# Patient Record
Sex: FEMALE | Race: WHITE | NOT HISPANIC OR LATINO | Employment: FULL TIME | URBAN - METROPOLITAN AREA
[De-identification: names, ages, dates, MRNs, and addresses within clinical notes are randomized per-mention and may not be internally consistent; named-entity substitution may affect disease eponyms.]

---

## 2017-01-04 ENCOUNTER — OFFICE VISIT (OUTPATIENT)
Dept: FAMILY MEDICINE | Facility: CLINIC | Age: 27
End: 2017-01-04
Payer: COMMERCIAL

## 2017-01-04 VITALS
SYSTOLIC BLOOD PRESSURE: 106 MMHG | HEART RATE: 88 BPM | BODY MASS INDEX: 19.92 KG/M2 | OXYGEN SATURATION: 98 % | TEMPERATURE: 98.6 F | WEIGHT: 108.25 LBS | HEIGHT: 62 IN | DIASTOLIC BLOOD PRESSURE: 68 MMHG

## 2017-01-04 DIAGNOSIS — F41.1 GAD (GENERALIZED ANXIETY DISORDER): Primary | ICD-10-CM

## 2017-01-04 PROCEDURE — 99213 OFFICE O/P EST LOW 20 MIN: CPT | Performed by: FAMILY MEDICINE

## 2017-01-04 RX ORDER — CITALOPRAM HYDROBROMIDE 20 MG/1
20 TABLET ORAL DAILY
Qty: 90 TABLET | Refills: 1 | Status: SHIPPED | OUTPATIENT
Start: 2017-01-04 | End: 2017-03-13

## 2017-01-04 ASSESSMENT — ANXIETY QUESTIONNAIRES
6. BECOMING EASILY ANNOYED OR IRRITABLE: SEVERAL DAYS
1. FEELING NERVOUS, ANXIOUS, OR ON EDGE: SEVERAL DAYS
GAD7 TOTAL SCORE: 5
7. FEELING AFRAID AS IF SOMETHING AWFUL MIGHT HAPPEN: NOT AT ALL
IF YOU CHECKED OFF ANY PROBLEMS ON THIS QUESTIONNAIRE, HOW DIFFICULT HAVE THESE PROBLEMS MADE IT FOR YOU TO DO YOUR WORK, TAKE CARE OF THINGS AT HOME, OR GET ALONG WITH OTHER PEOPLE: SOMEWHAT DIFFICULT
2. NOT BEING ABLE TO STOP OR CONTROL WORRYING: SEVERAL DAYS
5. BEING SO RESTLESS THAT IT IS HARD TO SIT STILL: NOT AT ALL
3. WORRYING TOO MUCH ABOUT DIFFERENT THINGS: SEVERAL DAYS

## 2017-01-04 ASSESSMENT — PATIENT HEALTH QUESTIONNAIRE - PHQ9: 5. POOR APPETITE OR OVEREATING: SEVERAL DAYS

## 2017-01-04 NOTE — PROGRESS NOTES
SUBJECTIVE:                                                    Shoaib Walker is a 26 year old female who presents to clinic today for the following health issues:    History of Present Illness  Depression & Anxiety Follow-up:     Depression/Anxiety:  Anxiety only    Status since last visit::  Improved    Other associated symptoms of anxiety::  None    Significant life event::  YES    Current substance use::  Alcohol    Depression symptoms::  None  Diet::  Regular (no restrictions)  Frequency of exercise::  2-3 days/week  Duration of exercise::  15-30 minutes  Taking medications regularly::  Yes  Medication side effects::  None  Additional concerns today::  No    Started working out more. Now working out 3 times per week. Keeping track of eating habits. Trying to go to bed at consistent time.   Past few days - out of work as show ended. Some stress due to that. 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     Alcohol - socially usually. Does not do that to relieve stress. Does not do it on regular basis.     Problem list and histories reviewed & adjusted, as indicated.  Additional history: as documented    Problem list, Medication list, Allergies, and Medical/Social/Surgical histories reviewed in EPIC and updated as appropriate.         Social History     Social History     Marital Status: Single     Spouse Name: N/A     Number of Children: N/A     Years of Education: N/A     Occupational History     actress Self Employed.     Social History Main Topics     Smoking status: Never Smoker      Smokeless tobacco: None     Alcohol Use: Yes      Comment: 2-4 drinks a week      Drug Use: Yes      Comment: marijuana rarely 2x a year     Sexual Activity:     Partners: Male     Birth Control/ Protection: Pill, Condom     Other Topics Concern     Parent/Sibling W/ Cabg, Mi Or Angioplasty Before 65f 55m? No     Social History Narrative     No Known Allergies  Patient Active Problem List   Diagnosis     FRANCISCO (generalized anxiety disorder)  "    Reviewed medications, social history and  past medical and surgical history.    Review of system: for general, respiratory, CVS, GI and psychiatry negative except for noted above.     EXAM:  /68 mmHg  Pulse 88  Temp(Src) 98.6  F (37  C) (Oral)  Ht 5' 2\" (157.5 cm)  Wt 108 lb 4 oz (49.102 kg)  BMI 19.79 kg/m2  SpO2 98%  LMP 11/17/2016 (Approximate)  Constitutional: healthy, alert and no distress   Psychiatric: mentation appears normal and affect normal/bright  Cardiovascular: RRR. No murmurs,  Respiratory: negative, Lungs clear. No crackles or wheezing. No tachypnea.     ASSESSMENT / PLAN:  (F41.1) FRANCISCO (generalized anxiety disorder)  (primary encounter diagnosis)  Comment: tolerating it well. Go up on the dose. Lately some stress due to being unemployed. Will contact our  if more help is needed.   Plan: citalopram (CELEXA) 20 MG tablet    If doing well - follow up in 3-6 months.        ROS      Physical Exam      "

## 2017-01-04 NOTE — NURSING NOTE
"Chief Complaint   Patient presents with     Anxiety     Depression       Initial /68 mmHg  Pulse 88  Temp(Src) 98.6  F (37  C) (Oral)  Ht 5' 2\" (1.575 m)  Wt 108 lb 4 oz (49.102 kg)  BMI 19.79 kg/m2  SpO2 98%  LMP 11/17/2016 (Approximate) Estimated body mass index is 19.79 kg/(m^2) as calculated from the following:    Height as of this encounter: 5' 2\" (1.575 m).    Weight as of this encounter: 108 lb 4 oz (49.102 kg).  BP completed using cuff size: aman Mayorga CMA      "

## 2017-01-05 ASSESSMENT — ANXIETY QUESTIONNAIRES: GAD7 TOTAL SCORE: 5

## 2017-01-05 ASSESSMENT — PATIENT HEALTH QUESTIONNAIRE - PHQ9: SUM OF ALL RESPONSES TO PHQ QUESTIONS 1-9: 7

## 2017-01-09 ENCOUNTER — TELEPHONE (OUTPATIENT)
Dept: FAMILY MEDICINE | Facility: CLINIC | Age: 27
End: 2017-01-09

## 2017-01-09 NOTE — TELEPHONE ENCOUNTER
Patient had her wisdom teeth pulled and is calling about being in excruciating pain  States she was only told to use Ibuprofen and it is not covering  Discussed with Dr De Santiago and he will see patient this afternoon to discuss pain control.  Appointment scheduled for 2 PM  Elinor Fortune RN

## 2017-03-13 ENCOUNTER — TELEPHONE (OUTPATIENT)
Dept: FAMILY MEDICINE | Facility: CLINIC | Age: 27
End: 2017-03-13

## 2017-03-13 ENCOUNTER — OFFICE VISIT (OUTPATIENT)
Dept: FAMILY MEDICINE | Facility: CLINIC | Age: 27
End: 2017-03-13
Payer: COMMERCIAL

## 2017-03-13 VITALS
SYSTOLIC BLOOD PRESSURE: 96 MMHG | HEIGHT: 62 IN | DIASTOLIC BLOOD PRESSURE: 70 MMHG | BODY MASS INDEX: 19.96 KG/M2 | TEMPERATURE: 98 F | WEIGHT: 108.5 LBS | HEART RATE: 86 BPM | OXYGEN SATURATION: 99 %

## 2017-03-13 DIAGNOSIS — K12.0 CANKER SORE: Primary | ICD-10-CM

## 2017-03-13 DIAGNOSIS — F41.1 GAD (GENERALIZED ANXIETY DISORDER): ICD-10-CM

## 2017-03-13 PROCEDURE — 99213 OFFICE O/P EST LOW 20 MIN: CPT | Performed by: FAMILY MEDICINE

## 2017-03-13 RX ORDER — CITALOPRAM HYDROBROMIDE 20 MG/1
20 TABLET ORAL DAILY
Qty: 90 TABLET | Refills: 1 | Status: SHIPPED | OUTPATIENT
Start: 2017-03-13 | End: 2017-11-06

## 2017-03-13 RX ORDER — TRIAMCINOLONE ACETONIDE 0.1 %
PASTE (GRAM) DENTAL 2 TIMES DAILY
Qty: 5 G | Refills: 2 | Status: SHIPPED | OUTPATIENT
Start: 2017-03-13 | End: 2017-11-22

## 2017-03-13 NOTE — MR AVS SNAPSHOT
"              After Visit Summary   3/13/2017    Shoaib Walker    MRN: [id number]           Patient Information     Date Of Birth          1990        Visit Information        Provider Department      3/13/2017 8:20 AM Shree De Santiago MD [facility] [place]        Today's Diagnoses     Canker sore    -  1    FRANCISCO (generalized anxiety disorder)           Follow-ups after your visit        Who to contact     If you have questions or need follow up information about today's clinic visit or your schedule please contact [facility] directly at [phone].  Normal or non-critical lab and imaging results will be communicated to you by Metatomixhart, letter or phone within 4 business days after the clinic has received the results. If you do not hear from us within 7 days, please contact the clinic through Metatomixhart or phone. If you have a critical or abnormal lab result, we will notify you by phone as soon as possible.  Submit refill requests through TapBlaze or call your pharmacy and they will forward the refill request to us. Please allow 3 business days for your refill to be completed.          Additional Information About Your Visit        MyChart Information     TapBlaze lets you send messages to your doctor, view your test results, renew your prescriptions, schedule appointments and more. To sign up, go to [email or web address] . Click on \"Log in\" on the left side of the screen, which will take you to the Welcome page. Then click on \"Sign up Now\" on the right side of the page.     You will be asked to enter the access code listed below, as well as some personal information. Please follow the directions to create your username and password.     Your access code is: [email or web address]  Expires: 2017  2:41 PM     Your access code will  in 90 days. If you need help or a new code, please call your [facility] or [phone].        Care EveryWhere ID     This is your Care EveryWhere " "ID. This could be used by other organizations to access your [facility] medical records  [id number]        Your Vitals Were     Pulse Temperature Height Pulse Oximetry BMI (Body Mass Index)       86 98  F (36.7  C) (Oral) 5' 2\" (1.575 m) 99% 19.84 kg/m2        Blood Pressure from Last 3 Encounters:   03/13/17 96/70   01/04/17 106/68   12/07/16 96/74    Weight from Last 3 Encounters:   03/13/17 108 lb 8 oz (49.2 kg)   01/04/17 108 lb 4 oz (49.1 kg)   12/07/16 106 lb 12 oz (48.4 kg)              Today, you had the following     No orders found for display         Today's Medication Changes          These changes are accurate as of: 3/13/17  2:41 PM.  If you have any questions, ask your nurse or doctor.               Start taking these medicines.        Dose/Directions    triamcinolone 0.1 % paste   Commonly known as:  KENALOG   Used for:  Canker sore   Started by:  Shree De Santiago MD        Take by mouth 2 times daily   Quantity:  5 g   Refills:  2            Where to get your medicines      These medications were sent to [place] [address] [place] [facility] [address]  [address] [facility] [address]     Phone:  [phone]     citalopram 20 MG tablet    triamcinolone 0.1 % paste                Primary Care Provider Office Phone # Fax #    Shree De Santiago [phone]       [address] [address]        Thank you!     Thank you for choosing [facility]  for your care. Our goal is always to provide you with excellent care. Hearing back from our patients is one way we can continue to improve our services. Please take a few minutes to complete the written survey that you may receive in the mail after your visit with us. Thank you!             Your Updated Medication List - Protect others around you: Learn how to safely use, store and throw away your medicines at www.disposemymeds.org.          This " list is accurate as of: 3/13/17  2:41 PM.  Always use your most recent med list.                   Brand Name Dispense Instructions for use    citalopram 20 MG tablet    celeXA    90 tablet    Take 1 tablet (20 mg) by mouth daily       levonorgestrel 20 MCG/24HR IUD    MIRENA     1 each by Intrauterine route once Put in 12/6/2016       triamcinolone 0.1 % paste    KENALOG    5 g    Take by mouth 2 times daily

## 2017-03-13 NOTE — TELEPHONE ENCOUNTER
"The patient would like guidance on proper application of triamcinolone (KENALOG) 0.1 % paste as it needs to be applied to her mouth and the patient said that she feels like it isn't \"sticking\". Please follow up with the patient,okay to leave a message.  "

## 2017-03-13 NOTE — NURSING NOTE
"Chief Complaint   Patient presents with     Mouth Problem       Initial BP 96/70 (Cuff Size: Adult Regular)  Pulse 86  Temp 98  F (36.7  C) (Oral)  Ht 5' 2\" (1.575 m)  Wt 108 lb 8 oz (49.2 kg)  SpO2 99%  BMI 19.84 kg/m2 Estimated body mass index is 19.84 kg/(m^2) as calculated from the following:    Height as of this encounter: 5' 2\" (1.575 m).    Weight as of this encounter: 108 lb 8 oz (49.2 kg).  Medication Reconciliation: complete     Gertrude Mayorga, PAMELA      "

## 2017-03-13 NOTE — PROGRESS NOTES
SUBJECTIVE:                                                    Shoaib Walker is a 26 year old female who presents to clinic today for the following health issues:      Mouth Problem       Duration: 4 days     Description (location/character/radiation): canker sore under tongue and feels burning starting underneath tongue radiating up all over mouth, intermittent     Intensity:  severe    Accompanying signs and symptoms: sore throat and post nasal drip     History (similar episodes/previous evaluation): None    Precipitating or alleviating factors: None    Therapies tried and outcome: 1 liter of water with tspn of salt and baking soda and tried new tooth paste last night      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   Unable to keep good sleep hygiene and proper diet. Busy with work.   Had food outside and next day some soreness in mouth, also had canker sore under tongue.   No dietary restrictions.   No similar trouble before.   No acid reflex symptoms.   No smoke exposure. No concern of oral stds.     Anxiety - dose is fine. No major side effects.     Problem list and histories reviewed & adjusted, as indicated.  Additional history: as documented    Labs reviewed in EPIC    Reviewed and updated as needed this visit by clinical staff       Reviewed and updated as needed this visit by Provider           Social History     Social History     Marital status: Single     Spouse name: N/A     Number of children: N/A     Years of education: N/A     Occupational History     actress Self Employed.     Social History Main Topics     Smoking status: Never Smoker     Smokeless tobacco: None     Alcohol use Yes      Comment: 2-4 drinks a week      Drug use: Yes      Comment: marijuana rarely 2x a year     Sexual activity: Yes     Partners: Male     Birth control/ protection: Pill, Condom     Other Topics Concern     Parent/Sibling W/ Cabg, Mi Or Angioplasty Before 65f 55m? No     Social History Narrative     No Known Allergies  Patient  "Active Problem List   Diagnosis     FRANCISCO (generalized anxiety disorder)     Reviewed medications, social history and  past medical and surgical history.    Review of system: for general, respiratory, CVS, GI and psychiatry negative except for noted above.     EXAM:  BP 96/70 (Cuff Size: Adult Regular)  Pulse 86  Temp 98  F (36.7  C) (Oral)  Ht 5' 2\" (1.575 m)  Wt 108 lb 8 oz (49.2 kg)  SpO2 99%  BMI 19.84 kg/m2  Constitutional: healthy, alert and no distress   Psychiatric: mentation appears normal and affect normal/bright  Cardiovascular: RRR. No murmurs,  Respiratory: negative, Lungs clear. No crackles or wheezing. No tachypnea.   Neck: minimal cervical adenopathy.   ENT: both TM clear. Throat clear. Tongue on side mild erythema without any ulcer, under tongue small canker sore present.     ASSESSMENT / PLAN:  (K12.0) Canker sore  (primary encounter diagnosis)  Comment: topical kenalog. Manage work stress. If persist, follow pu.   Plan: triamcinolone (KENALOG) 0.1 % paste             (F41.1) FRANCISCO (generalized anxiety disorder)  Comment:  Patient is tolerating current medication without any major side effects of concerns and current dose seems reasonable too.  Current medication regime is effective. Continue current treatment without any changes.   Plan: citalopram (CELEXA) 20 MG tablet             Follow up in 6 months.     "

## 2017-03-13 NOTE — TELEPHONE ENCOUNTER
Routing to PCP.    Dr. De Santiago-Please review.  Any particular suggestions to assist with application of Kenalog 0.1% paste?  Should patient try to dry area prior to application?    Thank you!  PEÑA ReyN, RN

## 2017-03-13 NOTE — TELEPHONE ENCOUNTER
Agree - may be ideal to wipe it with clean gauge or wash cloth and then apply medication.  Even if it does not stick well, she can keep it in her mouth and through saliva it may spread in affected area and then she can either spit it (if accidentally swallows it - it is OK)

## 2017-03-13 NOTE — TELEPHONE ENCOUNTER
Writer called patient and reviewed message per below from Dr. De Santiago.    Discussed could keep medication in her mouth for a few minutes to help ensure it gets to affected area.    Patient verbalized understanding and in agreement with plan.  PEÑA ReyN, RN

## 2017-03-16 ENCOUNTER — TELEPHONE (OUTPATIENT)
Dept: FAMILY MEDICINE | Facility: CLINIC | Age: 27
End: 2017-03-16

## 2017-03-16 NOTE — TELEPHONE ENCOUNTER
Cold sores, or apthous ulcers can last awhile. Ibuprofen is a good idea.  Ibuprofen: 800 mg (4 tabs) every 8 hours or 600 mg (3 tabs) every 6 hours. Take with food or water to protect your stomach.  Pain decreases in 7 to 10 days, with complete healing in 1 to 3 weeks. Particularly large ulcers (greater than 1 cm in diameter) often take longer to heal (2 to 4 weeks). Occasionally, a severe occurrence may be accompanied by nonspecific symptoms of illness, such as fever. Canker sores often return.    Sincerely,  Dr. Jada Wei MD  3/16/2017

## 2017-03-16 NOTE — TELEPHONE ENCOUNTER
I called Shoaib and gave her Dr.Sparks hebert.  I asked her to call back if sx worsen or persist.  Shoaib agrees with plan.    Tiffanie Wynn RN

## 2017-03-16 NOTE — TELEPHONE ENCOUNTER
"Patient was seen 2 days ago regarding burning in her mouth and a canker sore under her tongue.  Was told it is an \"oral mucosal thing\"  Patient is asking if there is anything else other than the Kenalog paste that she can do.  Should she be using a mouth wash?  Can she take Ibuprofen?  Please advise.  Elinor Fortune RN  "

## 2017-03-22 ENCOUNTER — TELEPHONE (OUTPATIENT)
Dept: FAMILY MEDICINE | Facility: CLINIC | Age: 27
End: 2017-03-22

## 2017-03-22 NOTE — TELEPHONE ENCOUNTER
Pt saw pcp on 3/13/17 for tongue pain.    Still has burning on tongue.  Omeprazole helped.  Using paste and vit B12.  Pt now has swelling under her tongue. Along jaw on right side.    No fevers or trouble swallowing.  1 canker sore under tongue, which is not new.  Pt made appt for f/u with pcp for today.  JONATAN Harper

## 2017-11-06 DIAGNOSIS — F41.1 GAD (GENERALIZED ANXIETY DISORDER): ICD-10-CM

## 2017-11-06 RX ORDER — CITALOPRAM HYDROBROMIDE 20 MG/1
20 TABLET ORAL DAILY
Qty: 30 TABLET | Refills: 0 | Status: SHIPPED | OUTPATIENT
Start: 2017-11-06 | End: 2017-11-07

## 2017-11-06 NOTE — TELEPHONE ENCOUNTER
Pt needs appt now. Noted on rx for reminder to pt. Refilled for 30 days only.     Alysha Marte RN

## 2017-11-07 DIAGNOSIS — F41.1 GAD (GENERALIZED ANXIETY DISORDER): ICD-10-CM

## 2017-11-09 RX ORDER — CITALOPRAM HYDROBROMIDE 20 MG/1
20 TABLET ORAL DAILY
Qty: 30 TABLET | Refills: 0 | Status: SHIPPED | OUTPATIENT
Start: 2017-11-09 | End: 2017-11-13

## 2017-11-09 NOTE — TELEPHONE ENCOUNTER
Patient is asking for a 3 month supply  Last OV was 3/1317.    Last office note states:  (F41.1) FRANCISCO (generalized anxiety disorder)  Comment:  Patient is tolerating current medication without any major side effects of concerns and current dose seems reasonable too.  Current medication regime is effective. Continue current treatment without any changes.   Plan: citalopram (CELEXA) 20 MG tablet         Follow up in 6 months.

## 2017-11-13 DIAGNOSIS — F41.1 GAD (GENERALIZED ANXIETY DISORDER): ICD-10-CM

## 2017-11-13 NOTE — TELEPHONE ENCOUNTER
See 11/7/17 TE. PT also asked for 90 day supply then.  She is due for an ov for 6 month f/u.  JONATAN Harper

## 2017-11-14 RX ORDER — CITALOPRAM HYDROBROMIDE 20 MG/1
20 TABLET ORAL DAILY
Qty: 90 TABLET | Refills: 0 | Status: SHIPPED | OUTPATIENT
Start: 2017-11-14 | End: 2017-11-22

## 2017-11-14 NOTE — TELEPHONE ENCOUNTER
Seems like she has never been notified about need for follow up.  RN should route it to reception to notify patient about need for follow up appointment.

## 2017-11-22 ENCOUNTER — OFFICE VISIT (OUTPATIENT)
Dept: FAMILY MEDICINE | Facility: CLINIC | Age: 27
End: 2017-11-22
Payer: COMMERCIAL

## 2017-11-22 VITALS
WEIGHT: 108.75 LBS | HEART RATE: 84 BPM | TEMPERATURE: 98.2 F | HEIGHT: 62 IN | DIASTOLIC BLOOD PRESSURE: 68 MMHG | OXYGEN SATURATION: 97 % | BODY MASS INDEX: 20.01 KG/M2 | SYSTOLIC BLOOD PRESSURE: 108 MMHG

## 2017-11-22 DIAGNOSIS — F41.1 GAD (GENERALIZED ANXIETY DISORDER): Primary | ICD-10-CM

## 2017-11-22 DIAGNOSIS — K64.9 HEMORRHOIDS, UNSPECIFIED HEMORRHOID TYPE: ICD-10-CM

## 2017-11-22 DIAGNOSIS — Z23 NEED FOR PROPHYLACTIC VACCINATION AND INOCULATION AGAINST INFLUENZA: ICD-10-CM

## 2017-11-22 PROCEDURE — 90686 IIV4 VACC NO PRSV 0.5 ML IM: CPT | Performed by: FAMILY MEDICINE

## 2017-11-22 PROCEDURE — 90471 IMMUNIZATION ADMIN: CPT | Performed by: FAMILY MEDICINE

## 2017-11-22 PROCEDURE — 99214 OFFICE O/P EST MOD 30 MIN: CPT | Mod: 25 | Performed by: FAMILY MEDICINE

## 2017-11-22 RX ORDER — CITALOPRAM HYDROBROMIDE 20 MG/1
20 TABLET ORAL DAILY
Qty: 90 TABLET | Refills: 3 | Status: SHIPPED | OUTPATIENT
Start: 2017-11-22 | End: 2019-04-22

## 2017-11-22 ASSESSMENT — PATIENT HEALTH QUESTIONNAIRE - PHQ9
5. POOR APPETITE OR OVEREATING: SEVERAL DAYS
SUM OF ALL RESPONSES TO PHQ QUESTIONS 1-9: 8

## 2017-11-22 ASSESSMENT — ANXIETY QUESTIONNAIRES
1. FEELING NERVOUS, ANXIOUS, OR ON EDGE: SEVERAL DAYS
IF YOU CHECKED OFF ANY PROBLEMS ON THIS QUESTIONNAIRE, HOW DIFFICULT HAVE THESE PROBLEMS MADE IT FOR YOU TO DO YOUR WORK, TAKE CARE OF THINGS AT HOME, OR GET ALONG WITH OTHER PEOPLE: NOT DIFFICULT AT ALL
2. NOT BEING ABLE TO STOP OR CONTROL WORRYING: NOT AT ALL
3. WORRYING TOO MUCH ABOUT DIFFERENT THINGS: SEVERAL DAYS
7. FEELING AFRAID AS IF SOMETHING AWFUL MIGHT HAPPEN: NOT AT ALL
6. BECOMING EASILY ANNOYED OR IRRITABLE: SEVERAL DAYS
5. BEING SO RESTLESS THAT IT IS HARD TO SIT STILL: NOT AT ALL
GAD7 TOTAL SCORE: 4

## 2017-11-22 NOTE — MR AVS SNAPSHOT
"              After Visit Summary   11/22/2017    Shoaib Walker    MRN: [id number]           Patient Information     Date Of Birth          1990        Visit Information        Provider Department      11/22/2017 11:00 AM Shree De Santiago MD [facility]        Today's Diagnoses     FRANCISCO (generalized anxiety disorder)    -  1    Hemorrhoids, unspecified hemorrhoid type        Need for prophylactic vaccination and inoculation against influenza          Care Instructions    Follow up in a year.    PHQ 9 and FRANCISCO 7 over phone in 6 months.           Follow-ups after your visit        Who to contact     If you have questions or need follow up information about today's clinic visit or your schedule please contact [facility] directly at [phone].  Normal or non-critical lab and imaging results will be communicated to you by MyChart, letter or phone within 4 business days after the clinic has received the results. If you do not hear from us within 7 days, please contact the clinic through MyChart or phone. If you have a critical or abnormal lab result, we will notify you by phone as soon as possible.  Submit refill requests through [employer] or call your pharmacy and they will forward the refill request to us. Please allow 3 business days for your refill to be completed.          Additional Information About Your Visit        [employer] Information     [employer] lets you send messages to your doctor, view your test results, renew your prescriptions, schedule appointments and more. To sign up, go to [email or web address] . Click on \"Log in\" on the left side of the screen, which will take you to the Welcome page. Then click on \"Sign up Now\" on the right side of the page.     You will be asked to enter the access code listed below, as well as some personal information. Please follow the directions to create your username and password.     Your access code is: [email or web address]  Expires: " "2018  2:44 PM     Your access code will  in 90 days. If you need help or a new code, please call your [facility] or [phone].        Care EveryWhere ID     This is your Care EveryWhere ID. This could be used by other organizations to access your [place] medical records  [id number]        Your Vitals Were     Pulse Temperature Height Pulse Oximetry BMI (Body Mass Index)       84 98.2  F (36.8  C) (Oral) 5' 2\" (1.575 m) 97% 19.89 kg/m2        Blood Pressure from Last 3 Encounters:   17 108/68   17 96/70   17 106/68    Weight from Last 3 Encounters:   17 108 lb 12 oz (49.3 kg)   17 108 lb 8 oz (49.2 kg)   17 108 lb 4 oz (49.1 kg)              We Performed the Following     FLU VAC, SPLIT VIRUS IM > 3 YO (QUADRIVALENT) [96359]     Vaccine Administration, Initial [65099]          Today's Medication Changes          These changes are accurate as of: 17  2:44 PM.  If you have any questions, ask your nurse or doctor.               Start taking these medicines.        Dose/Directions    hydrocortisone 2.5 % cream   Commonly known as:  ANUSOL-HC   Used for:  Hemorrhoids, unspecified hemorrhoid type   Started by:  Shree De Santiago MD        Place rectally 2 times daily For 2 weeks.   Quantity:  30 g   Refills:  0         Stop taking these medicines if you haven't already. Please contact your care team if you have questions.     triamcinolone 0.1 % paste   Commonly known as:  KENALOG   Stopped by:  Shree De Santiago MD                Where to get your medicines      These medications were sent to [facility] [address] [place], [place] - [address]  [address] [facility] [address]     Phone:  [phone]     citalopram 20 MG tablet    hydrocortisone 2.5 % cream                Primary Care Provider Office Phone # Fax #    Shree De Santiago [phone] [address] [place] [id number]      "   Equal Access to Services     [facility] HospitalANATOLY : Hadii aad ku hadvenkatedmundo Braxtonali, waparthda luqadaha, qaedta edkaryndayanna john. So [facility] [phone].    ATENCIÓN: Si habla español, tiene a cutler disposición servicios gratuitos de asistencia lingüística. Llame al [phone].    We comply with applicable federal civil rights laws and [place] laws. We do not discriminate on the basis of race, color, national origin, age, disability, sex, sexual orientation, or gender identity.            Thank you!     Thank you for choosing [facility]  for your care. Our goal is always to provide you with excellent care. Hearing back from our patients is one way we can continue to improve our services. Please take a few minutes to complete the written survey that you may receive in the mail after your visit with us. Thank you!             Your Updated Medication List - Protect others around you: Learn how to safely use, store and throw away your medicines at www.disposemymeds.org.          This list is accurate as of: 11/22/17  2:44 PM.  Always use your most recent med list.                   Brand Name Dispense Instructions for use Diagnosis    citalopram 20 MG tablet    celeXA    90 tablet    Take 1 tablet (20 mg) by mouth daily    FRANCISCO (generalized anxiety disorder)       hydrocortisone 2.5 % cream    ANUSOL-HC    30 g    Place rectally 2 times daily For 2 weeks.    Hemorrhoids, unspecified hemorrhoid type       levonorgestrel 20 MCG/24HR IUD    MIRENA     1 each by Intrauterine route once Put in 12/6/2016        MULTI VITAMIN DAILY PO           SENNA LAX PO

## 2017-11-22 NOTE — NURSING NOTE
"Chief Complaint   Patient presents with     Anxiety       Initial /68 (Cuff Size: Adult Regular)  Pulse 84  Temp 98.2  F (36.8  C) (Oral)  Ht 5' 2\" (1.575 m)  Wt 108 lb 12 oz (49.3 kg)  SpO2 97%  BMI 19.89 kg/m2 Estimated body mass index is 19.89 kg/(m^2) as calculated from the following:    Height as of this encounter: 5' 2\" (1.575 m).    Weight as of this encounter: 108 lb 12 oz (49.3 kg).  Medication Reconciliation: complete     Gertrude Mayorga, PAMELA      "

## 2017-11-22 NOTE — PROGRESS NOTES
SUBJECTIVE:   Shoaib Walker is a 27 year old female who presents to clinic today for the following health issues:    Anxiety Follow-Up    Status since last visit: Improved     Other associated symptoms:None but had a breakdown last week     Complicating factors:   Significant life event: Yes-  Financial problems    Current substance abuse: None  Depression symptoms: No  FRANCISCO-7 SCORE 12/7/2016 1/4/2017 11/22/2017   Total Score 14 5 4   Some encounter information is confidential and restricted. Go to Review Flowsheets activity to see all data.     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   Boyfriend acts an actor.   financial strain.     Break down a week ago from news but overall doing well. Family and boyfriend supportive.     Does not have time to go back to therapist. No suicidal thoughts.     Some intestinal issue - constipation- had colonoscopy few years ago and it was normal.   uess senna.   History of hemorrhoid. Itching around rectum. Used preparation H in the past without major benefit. Tried miralax and metamucil without benefit.     Problem list and histories reviewed & adjusted, as indicated.   Additional history: as documented    Labs reviewed in EPIC     Reviewed and updated as needed this visit by clinical staffTobacco  Allergies  Meds  Med Hx  Surg Hx  Fam Hx  Soc Hx      Reviewed and updated as needed this visit by Provider      Social History     Social History     Marital status: Single     Spouse name: N/A     Number of children: N/A     Years of education: N/A     Occupational History     actress Self Employed.     Social History Main Topics     Smoking status: Never Smoker     Smokeless tobacco: Never Used     Alcohol use Yes       "Comment: 2-4 drinks a week      Drug use: Yes      Comment: marijuana rarely 2x a year     Sexual activity: Yes     Partners: Male     Birth control/ protection: Pill, Condom     Other Topics Concern     Parent/Sibling W/ Cabg, Mi Or Angioplasty Before 65f 55m? No     Social History Narrative     No Known Allergies  Patient Active Problem List   Diagnosis     FRANCISCO (generalized anxiety disorder)     Hemorrhoids, unspecified hemorrhoid type     Reviewed medications, social history and  past medical and surgical history.    Review of system: for general, respiratory, CVS, GI and psychiatry negative except for noted above.     EXAM:  /68 (Cuff Size: Adult Regular)  Pulse 84  Temp 98.2  F (36.8  C) (Oral)  Ht 5' 2\" (1.575 m)  Wt 108 lb 12 oz (49.3 kg)  SpO2 97%  BMI 19.89 kg/m2  Constitutional: healthy, alert and no distress   Psychiatric: mentation appears normal and affect normal/bright  Rectal: Deferred    ASSESSMENT / PLAN:  (F41.1) FRANCISCO (generalized anxiety disorder)  (primary encounter diagnosis)  Comment: Patient is tolerating current medication without any major side effects of concerns and current dose seems reasonable too.  Current medication regime is effective. Continue current treatment without any changes.   Plan: citalopram (CELEXA) 20 MG tablet             (Z23) Need for prophylactic vaccination and inoculation against influenza  Comment:    Plan: FLU VAC, SPLIT VIRUS IM > 3 YO (QUADRIVALENT)         [37764], Vaccine Administration, Initial         [04096]             (K64.9) Hemorrhoids, unspecified hemorrhoid type  Comment:  From history. OK to try fiber, anusol for 2 weeks. If not improving, contact clinic for colorectal referral.   Plan: hydrocortisone (ANUSOL-HC) 2.5 % cream               Patient Instructions   Follow up in a year.    PHQ 9 and FRANCISCO 7 over phone in 6 months.         Injectable Influenza Immunization Documentation    1.  Is the person to be vaccinated sick today?   No    2. " Does the person to be vaccinated have an allergy to a component   of the vaccine?   No  Egg Allergy Algorithm Link    3. Has the person to be vaccinated ever had a serious reaction   to influenza vaccine in the past?   No    4. Has the person to be vaccinated ever had Guillain-Barré syndrome?   No    Form completed by Gertrude Mayorga CMA

## 2017-11-23 ASSESSMENT — ANXIETY QUESTIONNAIRES: GAD7 TOTAL SCORE: 4

## 2018-12-18 ENCOUNTER — OFFICE VISIT (OUTPATIENT)
Dept: FAMILY MEDICINE | Facility: CLINIC | Age: 28
End: 2018-12-18
Payer: COMMERCIAL

## 2018-12-18 VITALS
HEART RATE: 70 BPM | DIASTOLIC BLOOD PRESSURE: 75 MMHG | WEIGHT: 112.5 LBS | TEMPERATURE: 98.6 F | HEIGHT: 62 IN | SYSTOLIC BLOOD PRESSURE: 107 MMHG | RESPIRATION RATE: 16 BRPM | BODY MASS INDEX: 20.7 KG/M2 | OXYGEN SATURATION: 99 %

## 2018-12-18 DIAGNOSIS — M79.661 PAIN OF RIGHT LOWER LEG: ICD-10-CM

## 2018-12-18 DIAGNOSIS — K62.5 RECTAL BLEEDING: Primary | ICD-10-CM

## 2018-12-18 PROCEDURE — 99214 OFFICE O/P EST MOD 30 MIN: CPT | Performed by: FAMILY MEDICINE

## 2018-12-18 RX ORDER — HYDROCORTISONE ACETATE 25 MG/1
25 SUPPOSITORY RECTAL 2 TIMES DAILY
Qty: 20 SUPPOSITORY | Refills: 0 | Status: SHIPPED | OUTPATIENT
Start: 2018-12-18 | End: 2019-06-24

## 2018-12-18 RX ORDER — MELOXICAM 15 MG/1
15 TABLET ORAL DAILY
Qty: 14 TABLET | Refills: 0 | Status: SHIPPED | OUTPATIENT
Start: 2018-12-18 | End: 2019-06-24

## 2018-12-18 ASSESSMENT — MIFFLIN-ST. JEOR: SCORE: 1183.55

## 2018-12-18 NOTE — PROGRESS NOTES
SUBJECTIVE:   Shoaib Walker is a 28 year old female who presents to clinic today for the following health issues:      Hemorrhoids       Duration: last year     Description:   Pain: no   Itching: yes-severe    Accompanying signs and symptoms:   Blood in stool: YES- sometimes   Changes in stool pattern: YES- varies     History (similar episodes/previous evaluation): None    Precipitating or alleviating factors: None    Therapies tried and outcome: preparation H and anusol-hc outcome: not helpful more fluids   - itchy at night time.   Sometime blood when wipe. Bright red blood.   No previous exam.  Colon cancer - father has colostomy.   History of colonoscopy - 4 yrs ago and it was OK.   Constipation and straining present.   Stool - daily or every other day.   Stool softner - senna, miralax, coffee - did not help.   Water - frequently.   Fiber - in diet.     Lot more muscle pains all the time. Always some knee and ankle issues. Strained quad when in college. Tried to do yoga - it made it worse.   No her feet most of the days for work.   No supplement or herbs.   Right knee, right calf, right upper back. When stands favors right side more.     Problem list and histories reviewed & adjusted, as indicated.  Additional history: as documented    Labs reviewed in EPIC    Reviewed and updated as needed this visit by clinical staff    Reviewed and updated as needed this visit by Provider      Social History     Socioeconomic History     Marital status: Single     Spouse name: Not on file     Number of children: Not on file     Years of education: Not on file     Highest education level: Not on file   Social Needs     Financial resource strain: Not on file     Food insecurity - worry: Not on file     Food insecurity - inability: Not on file     Transportation needs - medical: Not on file     Transportation needs - non-medical: Not on file   Occupational History     Occupation: actress     Employer: SELF EMPLOYED.   Tobacco  "Use     Smoking status: Never Smoker     Smokeless tobacco: Never Used   Substance and Sexual Activity     Alcohol use: Yes     Comment: 2-4 drinks a week      Drug use: Yes     Comment: marijuana rarely 2x a year     Sexual activity: Yes     Partners: Male     Birth control/protection: Pill, Condom   Other Topics Concern     Parent/sibling w/ CABG, MI or angioplasty before 65F 55M? No   Social History Narrative     Not on file     No Known Allergies  Patient Active Problem List   Diagnosis     FRANCISCO (generalized anxiety disorder)     Hemorrhoids, unspecified hemorrhoid type     Reviewed medications, social history and  past medical and surgical history.    Review of system: for general, respiratory, CVS, GI and psychiatry negative except for noted above.     EXAM:  /75 (BP Location: Right arm, Patient Position: Sitting, Cuff Size: Adult Regular)   Pulse 70   Temp 98.6  F (37  C) (Oral)   Resp 16   Ht 1.575 m (5' 2\")   Wt 51 kg (112 lb 8 oz)   SpO2 99%   BMI 20.58 kg/m    Constitutional: healthy, alert and no distress   Psychiatric: mentation appears normal and affect normal/bright  Right lower limb - gait normal. ROM not restricted. No swelling. No tender spot.  Rectal - external rectal exam done - small hemorrhoidal lesion present around 6 ooclock position in lithotomy position    ASSESSMENT / PLAN:  (K62.5) Rectal bleeding  (primary encounter diagnosis)  Comment: With concern of internal hemorrhoid and constipation.  Treating constipation would be most ideal for long-term benefit and I have had a long conversation with the patient how to do dietary changes and use a stool softener.  She understood.  I will use a hydrocortisone suppository and if she does not see improvement it would be prudent to see colorectal for follow-up.  She has a family history of colon cancer and she has had a negative colonoscopy.  Plan: COLORECTAL SURGERY REFERRAL, hydrocortisone         (ANUSOL-HC) 25 MG suppository        "     (M79.334) Pain of right lower leg  Comment: Unclear etiology.  I suspect it is most likely related to her work habit.  She has some discomfort and she is willing to consider physical therapy for core strengthening.  We will try NSAIDs for 2 weeks and see how she does.  If her symptoms are persistent we will look into it further and look for any other causes of myalgia.  We talked about rheumatological condition but her symptoms are not classical for those.  Plan: meloxicam (MOBIC) 15 MG tablet, [facility] PT, HAND,         AND CHIROPRACTIC REFERRAL            Follow up: As needed and yearly.    The above note was dictated using voice recognition. Although reviewed after completion, some word and grammatical error may remain .      Patient Instructions       Patient Education     Treating Hemorrhoids: Self-Care    Follow your healthcare provider s advice about caring for your hemorrhoids at home. Some treatments help relieve symptoms right away. Others involve making changes in your diet and exercise habits. These can help ease constipation and prevent hemorrhoid symptoms from coming back.  Relieving symptoms  Your healthcare provider may prescribe anti-inflammatory medicine to help ease your symptoms. The following tips will also help relieve pain and swelling.    Take sitz baths. Taking a sitz bath means sitting in a few inches of warm bath water. Soaking for 10 minutes twice a day can provide welcome relief from painful hemorrhoids. It can also help the area stay clean.    Develop good bowel habits. Use the bathroom when you need to. Don t ignore the urge to move your bowels. This can lead to constipation, hard stools, and straining. Also, don t read while on the toilet. Sit only as long as needed. Wipe gently with soft, unscented toilet tissue or baby wipes.    Use ice packs. Placing an ice pack on a thrombosed external hemorrhoid can help relieve pain right away. It will also help reduce the blood clot. Use the ice  for 15 to 20 minutes at a time. Keep a cloth between the ice and your skin to prevent skin damage.    Use other measures. Laxatives and enemas can help ease constipation. But use them only on your healthcare provider s advice. For symptom relief, try using cotton pads soaked in witch hazel. These are available at most drugstores. Over-the-counter hemorrhoid ointments and petroleum jelly can also provide relief.  Add fiber to your diet  Adding fiber to your diet can help relieve constipation by making stools softer and easier to pass. To increase your fiber intake, your healthcare provider may recommend a bulking agent, such as psyllium. This is a high-fiber supplement available at most grocery stores and drugstores. Eating more fiber-rich foods will also help. There are two types of fiber:    Insoluble fiber is the main ingredient in bulking agents. It s also found in foods such as wheat bran, whole-grain breads, fresh fruits, and vegetables.    Soluble fiber is found in foods such as oat bran. Although soluble fiber is good for you, it may not ease constipation as much as foods high in insoluble fiber.  Drink more water  Along with a high-fiber diet, drinking more water can help ease constipation. This is because insoluble fiber absorbs water, making stools soft and bulky. Be sure to drink plenty of water throughout the day. Drinking fruit juices, such as prune juice or apple juice, can also help prevent constipation.  Get more exercise  Regular exercise aids digestion and helps prevent constipation. It s also great for your health. So talk with your healthcare provider about starting an exercise program. Low-impact activities, such as swimming or walking, are good places to start. Take it easy at first. And remember to drink plenty of water when you exercise.  High-fiber foods  High-fiber foods offer many benefits. By making your stools softer, they help heal and prevent swollen hemorrhoids. They may also help  reduce the risk of colon and rectal cancer. Best of all, they re usually low in calories and taste great. Here are some examples of fiber-rich foods.    Whole grains, such as wheat bran, corn bran, and brown rice.    Vegetables, especially carrots, broccoli, cabbage, and peas.    Fruits, such as apples, bananas, raisins, peaches, and pears.    Nuts and legumes, especially peanuts, lentils, and kidney beans.  Easy ways to add fiber  The tips below offer some simple ways to add more high-fiber foods to your meals.    Start your day with a high-fiber breakfast. Eat a wheat bran cereal along with a sliced banana. Or, try peanut butter on whole-wheat toast.    Eat carrot sticks for snacks. They re easy to prepare, taste great, and are low in calories.    Use whole-grain breads instead of white bread for sandwiches.    Eat fruits for treats. Try an apple and some raisins instead of a candy bar.   Date Last Reviewed: 7/1/2016 2000-2018 [employer]. [address] [place] [id number]. All rights reserved. This information is not intended as a substitute for professional medical care. Always follow your healthcare professional's instructions.

## 2018-12-18 NOTE — PATIENT INSTRUCTIONS
Patient Education     Treating Hemorrhoids: Self-Care    Follow your healthcare provider s advice about caring for your hemorrhoids at home. Some treatments help relieve symptoms right away. Others involve making changes in your diet and exercise habits. These can help ease constipation and prevent hemorrhoid symptoms from coming back.  Relieving symptoms  Your healthcare provider may prescribe anti-inflammatory medicine to help ease your symptoms. The following tips will also help relieve pain and swelling.    Take sitz baths. Taking a sitz bath means sitting in a few inches of warm bath water. Soaking for 10 minutes twice a day can provide welcome relief from painful hemorrhoids. It can also help the area stay clean.    Develop good bowel habits. Use the bathroom when you need to. Don t ignore the urge to move your bowels. This can lead to constipation, hard stools, and straining. Also, don t read while on the toilet. Sit only as long as needed. Wipe gently with soft, unscented toilet tissue or baby wipes.    Use ice packs. Placing an ice pack on a thrombosed external hemorrhoid can help relieve pain right away. It will also help reduce the blood clot. Use the ice for 15 to 20 minutes at a time. Keep a cloth between the ice and your skin to prevent skin damage.    Use other measures. Laxatives and enemas can help ease constipation. But use them only on your healthcare provider s advice. For symptom relief, try using cotton pads soaked in witch hazel. These are available at most drugstores. Over-the-counter hemorrhoid ointments and petroleum jelly can also provide relief.  Add fiber to your diet  Adding fiber to your diet can help relieve constipation by making stools softer and easier to pass. To increase your fiber intake, your healthcare provider may recommend a bulking agent, such as psyllium. This is a high-fiber supplement available at most grocery stores and drugstores. Eating more fiber-rich foods will  also help. There are two types of fiber:    Insoluble fiber is the main ingredient in bulking agents. It s also found in foods such as wheat bran, whole-grain breads, fresh fruits, and vegetables.    Soluble fiber is found in foods such as oat bran. Although soluble fiber is good for you, it may not ease constipation as much as foods high in insoluble fiber.  Drink more water  Along with a high-fiber diet, drinking more water can help ease constipation. This is because insoluble fiber absorbs water, making stools soft and bulky. Be sure to drink plenty of water throughout the day. Drinking fruit juices, such as prune juice or apple juice, can also help prevent constipation.  Get more exercise  Regular exercise aids digestion and helps prevent constipation. It s also great for your health. So talk with your healthcare provider about starting an exercise program. Low-impact activities, such as swimming or walking, are good places to start. Take it easy at first. And remember to drink plenty of water when you exercise.  High-fiber foods  High-fiber foods offer many benefits. By making your stools softer, they help heal and prevent swollen hemorrhoids. They may also help reduce the risk of colon and rectal cancer. Best of all, they re usually low in calories and taste great. Here are some examples of fiber-rich foods.    Whole grains, such as wheat bran, corn bran, and brown rice.    Vegetables, especially carrots, broccoli, cabbage, and peas.    Fruits, such as apples, bananas, raisins, peaches, and pears.    Nuts and legumes, especially peanuts, lentils, and kidney beans.  Easy ways to add fiber  The tips below offer some simple ways to add more high-fiber foods to your meals.    Start your day with a high-fiber breakfast. Eat a wheat bran cereal along with a sliced banana. Or, try peanut butter on whole-wheat toast.    Eat carrot sticks for snacks. They re easy to prepare, taste great, and are low in calories.    Use  whole-grain breads instead of white bread for sandwiches.    Eat fruits for treats. Try an apple and some raisins instead of a candy bar.   Date Last Reviewed: 7/1/2016 2000-2018 [employer]. [address] [place] [address], [place] [address]. All rights reserved. This information is not intended as a substitute for professional medical care. Always follow your healthcare professional's instructions.

## 2019-04-22 DIAGNOSIS — F41.1 GAD (GENERALIZED ANXIETY DISORDER): ICD-10-CM

## 2019-04-22 RX ORDER — CITALOPRAM HYDROBROMIDE 20 MG/1
20 TABLET ORAL DAILY
Qty: 30 TABLET | Refills: 0 | Status: SHIPPED | OUTPATIENT
Start: 2019-04-22 | End: 2019-05-21

## 2019-04-22 NOTE — TELEPHONE ENCOUNTER
"Requested Prescriptions   Pending Prescriptions Disp Refills     citalopram (CELEXA) 20 MG tablet [Pharmacy Med Name: CITALOPRAM HBR 20 MG TABLET] 90 tablet 1     Sig: TAKE 1 TABLET (20 MG) BY MOUTH DAILY  Last Written Prescription Date:  11/22/2017  Last Fill Quantity: 90 tablet,  # refills: 3   Last Office Visit: 12/18/2018   Future Office Visit:            SSRIs Protocol Passed - 4/22/2019 12:23 PM        Passed - Recent (12 mo) or future (30 days) visit within the authorizing provider's specialty     Patient had office visit in the last 12 months or has a visit in the next 30 days with authorizing provider or within the authorizing provider's specialty.  See \"Patient Info\" tab in inbasket, or \"Choose Columns\" in Meds & Orders section of the refill encounter.            Passed - Medication is active on med list        Passed - Patient is age 18 or older        Passed - No active pregnancy on record        Passed - No positive pregnancy test in last 12 months          "

## 2019-05-21 DIAGNOSIS — F41.1 GAD (GENERALIZED ANXIETY DISORDER): ICD-10-CM

## 2019-05-21 NOTE — TELEPHONE ENCOUNTER
"Requested Prescriptions   Pending Prescriptions Disp Refills     citalopram (CELEXA) 20 MG tablet [Pharmacy Med Name: CITALOPRAM HBR 20 MG TABLET] 30 tablet 0     Sig: TAKE 1 TABLET BY MOUTH EVERY DAY  Last Written Prescription Date:  4/22/2019  Last Fill Quantity: 30 tablet,  # refills: 0   Last Office Visit: 12/18/2018   Future Office Visit:            SSRIs Protocol Passed - 5/21/2019  1:17 AM        Passed - Recent (12 mo) or future (30 days) visit within the authorizing provider's specialty     Patient had office visit in the last 12 months or has a visit in the next 30 days with authorizing provider or within the authorizing provider's specialty.  See \"Patient Info\" tab in inbasket, or \"Choose Columns\" in Meds & Orders section of the refill encounter.            Passed - Medication is active on med list        Passed - Patient is age 18 or older        Passed - No active pregnancy on record        Passed - No positive pregnancy test in last 12 months          "

## 2019-05-22 RX ORDER — CITALOPRAM HYDROBROMIDE 20 MG/1
TABLET ORAL
Qty: 15 TABLET | Refills: 0 | Status: SHIPPED | OUTPATIENT
Start: 2019-05-22 | End: 2019-06-14

## 2019-05-22 NOTE — TELEPHONE ENCOUNTER
Routing refill request to provider for review/approval because:  Nicky given x1 and patient did not follow up, please advise

## 2019-06-14 DIAGNOSIS — F41.1 GAD (GENERALIZED ANXIETY DISORDER): ICD-10-CM

## 2019-06-14 NOTE — TELEPHONE ENCOUNTER
"Requested Prescriptions   Pending Prescriptions Disp Refills     citalopram (CELEXA) 20 MG tablet [Pharmacy Med Name: CITALOPRAM HBR 20 MG TABLET]  Last Written Prescription Date:  5/22/2019  Last Fill Quantity: 15 tabs,  # refills: 0   Last office visit: 12/18/2018 with prescribing provider:  Jonnathan   Future Office Visit:     15 tablet 0     Sig: TAKE 1 TABLET BY MOUTH EVERY DAY       SSRIs Protocol Passed - 6/14/2019  9:14 AM        Passed - Recent (12 mo) or future (30 days) visit within the authorizing provider's specialty     Patient had office visit in the last 12 months or has a visit in the next 30 days with authorizing provider or within the authorizing provider's specialty.  See \"Patient Info\" tab in inbasket, or \"Choose Columns\" in Meds & Orders section of the refill encounter.              Passed - Medication is active on med list        Passed - Patient is age 18 or older        Passed - No active pregnancy on record        Passed - No positive pregnancy test in last 12 months          "

## 2019-06-17 RX ORDER — CITALOPRAM HYDROBROMIDE 20 MG/1
TABLET ORAL
Qty: 7 TABLET | Refills: 0 | Status: SHIPPED | OUTPATIENT
Start: 2019-06-17 | End: 2019-06-24

## 2019-06-17 NOTE — TELEPHONE ENCOUNTER
"Routing refill request to provider for review/approval because:  --Last two refill orders sent 4/22 and 5/22 included reminders needs an office visit with tapered dispense.  --5/21/19 refill encounter Jonnathan wrote  - \"Please advise patient to schedule an appointment before any further refill. \"  -- left for patient 5/23/19 asking her to schedule CPE.     ,  --Please contact patient and ask to schedule physical.      --A refill request for below medication was sent to the provider.            Last Office Visit: 12/18/2018 acute care.    Future Office Visit:  none               "

## 2019-06-24 ENCOUNTER — OFFICE VISIT (OUTPATIENT)
Dept: FAMILY MEDICINE | Facility: CLINIC | Age: 29
End: 2019-06-24
Payer: COMMERCIAL

## 2019-06-24 VITALS
DIASTOLIC BLOOD PRESSURE: 69 MMHG | BODY MASS INDEX: 20.8 KG/M2 | RESPIRATION RATE: 16 BRPM | HEIGHT: 62 IN | TEMPERATURE: 97 F | SYSTOLIC BLOOD PRESSURE: 103 MMHG | WEIGHT: 113 LBS | OXYGEN SATURATION: 95 % | HEART RATE: 72 BPM

## 2019-06-24 DIAGNOSIS — Z12.4 SCREENING FOR CERVICAL CANCER: ICD-10-CM

## 2019-06-24 DIAGNOSIS — Z13.6 SCREENING FOR CARDIOVASCULAR CONDITION: ICD-10-CM

## 2019-06-24 DIAGNOSIS — F41.1 GAD (GENERALIZED ANXIETY DISORDER): ICD-10-CM

## 2019-06-24 DIAGNOSIS — Z00.00 ROUTINE GENERAL MEDICAL EXAMINATION AT A HEALTH CARE FACILITY: Primary | ICD-10-CM

## 2019-06-24 DIAGNOSIS — Z13.1 SCREENING FOR DIABETES MELLITUS: ICD-10-CM

## 2019-06-24 PROCEDURE — G0145 SCR C/V CYTO,THINLAYER,RESCR: HCPCS | Performed by: FAMILY MEDICINE

## 2019-06-24 PROCEDURE — 82947 ASSAY GLUCOSE BLOOD QUANT: CPT | Performed by: FAMILY MEDICINE

## 2019-06-24 PROCEDURE — 87624 HPV HI-RISK TYP POOLED RSLT: CPT | Performed by: FAMILY MEDICINE

## 2019-06-24 PROCEDURE — 36415 COLL VENOUS BLD VENIPUNCTURE: CPT | Performed by: FAMILY MEDICINE

## 2019-06-24 PROCEDURE — 80061 LIPID PANEL: CPT | Performed by: FAMILY MEDICINE

## 2019-06-24 PROCEDURE — 99395 PREV VISIT EST AGE 18-39: CPT | Performed by: FAMILY MEDICINE

## 2019-06-24 RX ORDER — CITALOPRAM HYDROBROMIDE 20 MG/1
20 TABLET ORAL DAILY
Qty: 90 TABLET | Refills: 3 | Status: SHIPPED | OUTPATIENT
Start: 2019-06-24 | End: 2020-07-16

## 2019-06-24 ASSESSMENT — PATIENT HEALTH QUESTIONNAIRE - PHQ9
SUM OF ALL RESPONSES TO PHQ QUESTIONS 1-9: 10
5. POOR APPETITE OR OVEREATING: MORE THAN HALF THE DAYS

## 2019-06-24 ASSESSMENT — ANXIETY QUESTIONNAIRES
2. NOT BEING ABLE TO STOP OR CONTROL WORRYING: NOT AT ALL
5. BEING SO RESTLESS THAT IT IS HARD TO SIT STILL: SEVERAL DAYS
IF YOU CHECKED OFF ANY PROBLEMS ON THIS QUESTIONNAIRE, HOW DIFFICULT HAVE THESE PROBLEMS MADE IT FOR YOU TO DO YOUR WORK, TAKE CARE OF THINGS AT HOME, OR GET ALONG WITH OTHER PEOPLE: SOMEWHAT DIFFICULT
6. BECOMING EASILY ANNOYED OR IRRITABLE: NEARLY EVERY DAY
7. FEELING AFRAID AS IF SOMETHING AWFUL MIGHT HAPPEN: NOT AT ALL
GAD7 TOTAL SCORE: 8
1. FEELING NERVOUS, ANXIOUS, OR ON EDGE: SEVERAL DAYS
3. WORRYING TOO MUCH ABOUT DIFFERENT THINGS: SEVERAL DAYS

## 2019-06-24 ASSESSMENT — ENCOUNTER SYMPTOMS
JOINT SWELLING: 0
SORE THROAT: 1
ABDOMINAL PAIN: 0
EYE PAIN: 0
ARTHRALGIAS: 1
WEAKNESS: 1
FREQUENCY: 0
CONSTIPATION: 1
HEMATURIA: 0
DYSURIA: 0
HEADACHES: 0
PARESTHESIAS: 0
PALPITATIONS: 0
DIZZINESS: 1
FEVER: 0
HEMATOCHEZIA: 0
MYALGIAS: 1
HEARTBURN: 0
CHILLS: 0
DIARRHEA: 1
SHORTNESS OF BREATH: 1
NERVOUS/ANXIOUS: 1
NAUSEA: 0
COUGH: 0

## 2019-06-24 ASSESSMENT — MIFFLIN-ST. JEOR: SCORE: 1180.81
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
## 2019-06-24 NOTE — LETTER
[place] [id number] [place] [id number] [phone]          June 28, 2019    Shoaib Wright                                                                                                                     [address] [place] [id number]          Dear Shoaib,  Your cholesterol and diabetes tests are within normal limit.  Your good cholesterol HDL is slightly on lower side which improves with physical activity.     Results for orders placed or performed in visit on 06/24/19   Pap imaged thin layer screen with HPV - recommended age 30 - 65 years (select HPV order below)   Result Value Ref Range    PAP NIL     Copath Report         Patient Name: SHOAIB WRIGHT  MR#: [id number]  Specimen #: [id number]  Collected: 6/24/2019  Received: 6/25/2019  Reported: 6/27/2019 09:27  Ordering Phy(s): KAMALJIT MCDANIEL    For improved result formatting, select 'View Enhanced Report Format' under   Linked Documents section.    SPECIMEN/STAIN PROCESS:  Pap imaged thin layer prep screening (Surepath, FocalPoint with guided   screening)       Pap-Cyto x 1, HPV ordered x 1    SOURCE: Cervical, endocervical  ----------------------------------------------------------------   Pap imaged thin layer prep screening (Surepath, FocalPoint with guided   screening)  SPECIMEN ADEQUACY:  Satisfactory for evaluation.  -Transformation zone component present.    CYTOLOGIC INTERPRETATION:    Negative for intraepithelial lesion or malignancy    Electronically signed out by:  LISA Simmons (ASCP)    CLINICAL HISTORY:    Intra-Uterine Device,    Papanicolaou Test Limitations:  Cervical cytology is a screening test with   limited sensiti vity; regular  screening is critical for cancer prevention; Pap tests are primarily   effective for the diagnosis/prevention of  squamous cell carcinoma, not adenocarcinomas or other cancers.    COLLECTION SITE:  Client:  [facility]  [facility]  Location: [facility] (B)    The technical component of this testing was completed at the [facility], with the professional component performed   at the [facility], [address] [place] [id number] ([phone])       HPV High Risk Types DNA Cervical   Result Value Ref Range    HPV Source SurePath     HPV 16 DNA Negative NEG^Negative    HPV 18 DNA Negative NEG^Negative    Other HR HPV Negative NEG^Negative    Final Diagnosis This patient's sample is negative for HPV DNA.     Specimen Description Cervical Cells    Lipid panel reflex to direct LDL Non-fasting   Result Value Ref Range    Cholesterol 140 <200 mg/dL    Triglycerides 141 <150 mg/dL    HDL Cholesterol 47 (L) >49 mg/dL    LDL Cholesterol Calculated 65 <100 mg/dL    Non HDL Cholesterol 93 <130 mg/dL   Glucose   Result Value Ref Range    Glucose 77 70 - 99 mg/dL       Sincerely,         Shree De Santiago MD.

## 2019-06-24 NOTE — LETTER
July 2, 2019    Shoaib Walker  [address] [place] [address]  [facility] [address]    Dear ,  This letter is regarding your recent Pap smear (cervical cancer screening) and Human Papillomavirus (HPV) test.  We are happy to inform you that your Pap smear result is normal. Cervical cancer is closely linked with certain types of HPV. Your results showed no evidence of high-risk HPV.  We recommend you have your next PAP smear and HPV test in 5 years.  You will still need to return to the clinic every year for an annual exam and other preventive tests.  If you have additional questions regarding this result, please call our registered nurse, Lacey at [phone].  Sincerely,    Shree De Santiago MD /[facility]

## 2019-06-25 LAB
CHOLEST SERPL-MCNC: 140 MG/DL
GLUCOSE SERPL-MCNC: 77 MG/DL (ref 70–99)
HDLC SERPL-MCNC: 47 MG/DL
LDLC SERPL CALC-MCNC: 65 MG/DL
NONHDLC SERPL-MCNC: 93 MG/DL
TRIGL SERPL-MCNC: 141 MG/DL

## 2019-06-25 ASSESSMENT — ANXIETY QUESTIONNAIRES: GAD7 TOTAL SCORE: 8

## 2019-06-27 LAB
COPATH REPORT: NORMAL
PAP: NORMAL

## 2019-06-28 LAB
FINAL DIAGNOSIS: NORMAL
HPV HR 12 DNA CVX QL NAA+PROBE: NEGATIVE
HPV16 DNA SPEC QL NAA+PROBE: NEGATIVE
HPV18 DNA SPEC QL NAA+PROBE: NEGATIVE
SPECIMEN DESCRIPTION: NORMAL
SPECIMEN SOURCE CVX/VAG CYTO: NORMAL

## 2020-01-21 ENCOUNTER — OFFICE VISIT (OUTPATIENT)
Dept: FAMILY MEDICINE | Facility: CLINIC | Age: 30
End: 2020-01-21
Payer: COMMERCIAL

## 2020-01-21 ENCOUNTER — NURSE TRIAGE (OUTPATIENT)
Dept: NURSING | Facility: CLINIC | Age: 30
End: 2020-01-21

## 2020-01-21 VITALS
WEIGHT: 112 LBS | BODY MASS INDEX: 20.61 KG/M2 | OXYGEN SATURATION: 98 % | HEART RATE: 75 BPM | SYSTOLIC BLOOD PRESSURE: 100 MMHG | RESPIRATION RATE: 16 BRPM | TEMPERATURE: 98.1 F | HEIGHT: 62 IN | DIASTOLIC BLOOD PRESSURE: 68 MMHG

## 2020-01-21 DIAGNOSIS — H57.89 REDNESS OF LEFT EYE: Primary | ICD-10-CM

## 2020-01-21 PROCEDURE — 99213 OFFICE O/P EST LOW 20 MIN: CPT | Performed by: FAMILY MEDICINE

## 2020-01-21 RX ORDER — POLYMYXIN B SULFATE AND TRIMETHOPRIM 1; 10000 MG/ML; [USP'U]/ML
1-2 SOLUTION OPHTHALMIC
Qty: 1 BOTTLE | Refills: 0 | Status: SHIPPED | OUTPATIENT
Start: 2020-01-21 | End: 2020-06-01

## 2020-01-21 ASSESSMENT — ANXIETY QUESTIONNAIRES
7. FEELING AFRAID AS IF SOMETHING AWFUL MIGHT HAPPEN: NOT AT ALL
2. NOT BEING ABLE TO STOP OR CONTROL WORRYING: SEVERAL DAYS
GAD7 TOTAL SCORE: 4
4. TROUBLE RELAXING: NOT AT ALL
GAD7 TOTAL SCORE: 4
1. FEELING NERVOUS, ANXIOUS, OR ON EDGE: SEVERAL DAYS
GAD7 TOTAL SCORE: 4
3. WORRYING TOO MUCH ABOUT DIFFERENT THINGS: SEVERAL DAYS
7. FEELING AFRAID AS IF SOMETHING AWFUL MIGHT HAPPEN: NOT AT ALL
6. BECOMING EASILY ANNOYED OR IRRITABLE: SEVERAL DAYS
5. BEING SO RESTLESS THAT IT IS HARD TO SIT STILL: NOT AT ALL

## 2020-01-21 ASSESSMENT — PATIENT HEALTH QUESTIONNAIRE - PHQ9
10. IF YOU CHECKED OFF ANY PROBLEMS, HOW DIFFICULT HAVE THESE PROBLEMS MADE IT FOR YOU TO DO YOUR WORK, TAKE CARE OF THINGS AT HOME, OR GET ALONG WITH OTHER PEOPLE: SOMEWHAT DIFFICULT
SUM OF ALL RESPONSES TO PHQ QUESTIONS 1-9: 5
SUM OF ALL RESPONSES TO PHQ QUESTIONS 1-9: 5

## 2020-01-21 ASSESSMENT — MIFFLIN-ST. JEOR: SCORE: 1176.28

## 2020-01-21 NOTE — TELEPHONE ENCOUNTER
"Shoaib is calling and states that left eye has been sore for about one week.  Eye is pink and sore on the bottom and no discharge.  Denies fever.  Now whole eye is red.  Eye is blinking due to pain.  Shoaib as washed her eye out.      Reason for Disposition    Eyelid is red and painful (or tender to touch)    Additional Information    Negative: Severe eye pain    Negative: [1] Eyelids are very swollen (shut or almost) AND [2] fever    Negative: [1] Eyelid (outer) is very red (or tender to touch) AND [2] fever    Negative: [1] Foreign body sensation (\"feels like something is in there\") AND [2] irrigation didn't help    Negative: Vomiting    Negative: [1] Recent eye surgery AND [2] increasing eye pain    Negative: Patient sounds very sick or weak to the triager    Negative: Blurred vision    Negative: [1] Eye pain AND [2] more than mild    Negative: Cloudy spot or sore seen on the cornea (clear part of the eye)    Negative: Eyelid is very swollen (shut or almost)    Protocols used: EYE - RED WITHOUT PUS-A-AH      "

## 2020-01-21 NOTE — PROGRESS NOTES
Subjective     Shoaib Walker is a 29 year old female who presents to clinic today for the following health issues:    HPI   Eye(s) Problem      Duration: x 1 week, worsened today    Description:  Location: left  Pain: YES  Redness: YES  Discharge: no     Accompanying signs and symptoms: Blinking due to pain    History (Trauma, foreign body exposure,): None    Precipitating or alleviating factors (contact use): None    Therapies tried and outcome: Washed eye out which did not help    1.5 weeks ago noticed it.   Using hydrocortisone around eyelid for eczema.     This am - more redness.   No crusing.   No trouble with opening eyes.   No contact lenses.   History of lasik.  Some dull pain left upper eyelid.   Some post nasal drip which is not new.   No known pink eye exposure.     Mood is OK. Some social stressor - mostly money. Overall feels fine with current dose of celexa.    Answers for HPI/ROS submitted by the patient on 1/21/2020   If you checked off any problems, how difficult have these problems made it for you to do your work, take care of things at home, or get along with other people?: Somewhat difficult  PHQ9 TOTAL SCORE: 5  FRANCISCO 7 TOTAL SCORE: 4      Social History     Occupational History     Occupation: actress     Employer: SELF EMPLOYED.   Tobacco Use     Smoking status: Never Smoker     Smokeless tobacco: Never Used   Substance and Sexual Activity     Alcohol use: Yes     Comment: 2-4 drinks a week      Drug use: Yes     Comment: marijuana rarely 2x a year     Sexual activity: Yes     Partners: Male     Birth control/protection: Pill, Condom     No Known Allergies  Patient Active Problem List   Diagnosis     FRANCISCO (generalized anxiety disorder)     Hemorrhoids, unspecified hemorrhoid type     Reviewed medications, social history and  past medical and surgical history.    Review of system: for general, respiratory, CVS, GI and psychiatry negative except for noted above.     EXAM:  /68 (BP Location:  "Right arm, Patient Position: Sitting, Cuff Size: Adult Regular)   Pulse 75   Temp 98.1  F (36.7  C) (Oral)   Resp 16   Ht 1.575 m (5' 2\")   Wt 50.8 kg (112 lb)   SpO2 98%   Breastfeeding No   BMI 20.49 kg/m    Constitutional: healthy, alert and no distress   Psychiatric: mentation appears normal and affect normal/bright  Both eyes examined - RABIA. Conjunctival erythema left lateral canthus. Left upper eyelid mild swelling. No erythema.     ASSESSMENT / PLAN:  (H57.89) Redness of left eye  (primary encounter diagnosis)  Comment: could be from irritation. Does not have typical symptoms of conjunctivitis but due to duration I think it is appropriate to treat with antibiotics drops. Not using contacts. If not improving - call for ophthalmology referral.   Plan: trimethoprim-polymyxin b (POLYTRIM) 75572-2.1         UNIT/ML-% ophthalmic solution                  The above note was dictated using voice recognition. Although reviewed after completion, some word and grammatical error may remain .            "

## 2020-01-22 ASSESSMENT — PATIENT HEALTH QUESTIONNAIRE - PHQ9: SUM OF ALL RESPONSES TO PHQ QUESTIONS 1-9: 5

## 2020-01-22 ASSESSMENT — ANXIETY QUESTIONNAIRES: GAD7 TOTAL SCORE: 4

## 2020-06-01 ENCOUNTER — OFFICE VISIT (OUTPATIENT)
Dept: FAMILY MEDICINE | Facility: CLINIC | Age: 30
End: 2020-06-01

## 2020-06-01 VITALS
WEIGHT: 114.2 LBS | HEART RATE: 111 BPM | OXYGEN SATURATION: 97 % | DIASTOLIC BLOOD PRESSURE: 70 MMHG | TEMPERATURE: 98.7 F | RESPIRATION RATE: 16 BRPM | SYSTOLIC BLOOD PRESSURE: 108 MMHG | BODY MASS INDEX: 20.89 KG/M2

## 2020-06-01 DIAGNOSIS — F41.1 ANXIETY IN ACUTE STRESS REACTION: Primary | ICD-10-CM

## 2020-06-01 DIAGNOSIS — F43.0 ANXIETY IN ACUTE STRESS REACTION: Primary | ICD-10-CM

## 2020-06-01 DIAGNOSIS — F41.1 GAD (GENERALIZED ANXIETY DISORDER): ICD-10-CM

## 2020-06-01 PROCEDURE — 99214 OFFICE O/P EST MOD 30 MIN: CPT | Performed by: NURSE PRACTITIONER

## 2020-06-01 RX ORDER — LORAZEPAM 1 MG/1
0.5 TABLET ORAL EVERY 8 HOURS PRN
Qty: 30 TABLET | Refills: 0 | Status: SHIPPED | OUTPATIENT
Start: 2020-06-01

## 2020-06-01 RX ORDER — POLYMYXIN B SULFATE AND TRIMETHOPRIM 1; 10000 MG/ML; [USP'U]/ML
SOLUTION OPHTHALMIC
COMMUNITY
Start: 2020-01-21 | End: 2021-01-20

## 2020-06-01 RX ORDER — LORAZEPAM 1 MG/1
0.5 TABLET ORAL EVERY 8 HOURS PRN
Qty: 30 TABLET | Refills: 0 | Status: SHIPPED | OUTPATIENT
Start: 2020-06-01 | End: 2020-06-01

## 2020-06-01 ASSESSMENT — ANXIETY QUESTIONNAIRES
1. FEELING NERVOUS, ANXIOUS, OR ON EDGE: MORE THAN HALF THE DAYS
5. BEING SO RESTLESS THAT IT IS HARD TO SIT STILL: SEVERAL DAYS
2. NOT BEING ABLE TO STOP OR CONTROL WORRYING: MORE THAN HALF THE DAYS
7. FEELING AFRAID AS IF SOMETHING AWFUL MIGHT HAPPEN: MORE THAN HALF THE DAYS
6. BECOMING EASILY ANNOYED OR IRRITABLE: MORE THAN HALF THE DAYS
3. WORRYING TOO MUCH ABOUT DIFFERENT THINGS: MORE THAN HALF THE DAYS
IF YOU CHECKED OFF ANY PROBLEMS ON THIS QUESTIONNAIRE, HOW DIFFICULT HAVE THESE PROBLEMS MADE IT FOR YOU TO DO YOUR WORK, TAKE CARE OF THINGS AT HOME, OR GET ALONG WITH OTHER PEOPLE: SOMEWHAT DIFFICULT
GAD7 TOTAL SCORE: 13

## 2020-06-01 ASSESSMENT — PATIENT HEALTH QUESTIONNAIRE - PHQ9
5. POOR APPETITE OR OVEREATING: MORE THAN HALF THE DAYS
SUM OF ALL RESPONSES TO PHQ QUESTIONS 1-9: 11
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
## 2020-06-01 NOTE — PATIENT INSTRUCTIONS
Thirty minuets exercise daily at least, this is critical. You must exhaust your body, your body is trying to protect you by increasing your energy and anxiety. Spend all the energy your body is providing.     Please eat mindfully, I would like you to choose meals carefully, use meal time to be thankful of your ability to have nutrition food, and to consider those who have food insecurity, enjoy our food.    Social Media: schedule your social media time to once or twice daily and try to stick to the times you have scheduled. Try muting all in your feed except for POC, seek out groups who are predominately people of color and listen to the narrative through their eyes.     Continue taking medications as prescribed. Please call if you have questions about the medication I have prescribed for you.     Therapy, this is just one suggestion, you can go anywhere you feel comfortable.   Basil Iglesias   [address],   [place] [address]  Phone: [phone]

## 2020-06-02 ASSESSMENT — ANXIETY QUESTIONNAIRES: GAD7 TOTAL SCORE: 13

## 2020-06-24 ENCOUNTER — VIRTUAL VISIT (OUTPATIENT)
Dept: FAMILY MEDICINE | Facility: CLINIC | Age: 30
End: 2020-06-24

## 2020-06-24 DIAGNOSIS — F41.1 ANXIETY IN ACUTE STRESS REACTION: Primary | ICD-10-CM

## 2020-06-24 DIAGNOSIS — F43.0 ANXIETY IN ACUTE STRESS REACTION: Primary | ICD-10-CM

## 2020-06-24 PROCEDURE — 99213 OFFICE O/P EST LOW 20 MIN: CPT | Mod: 95 | Performed by: NURSE PRACTITIONER

## 2020-06-29 NOTE — PROGRESS NOTES
"Problem(s) Oriented visit      The patient has been notified of following:     \"This video visit will be conducted via a call between you and your physician/provider. We have found that certain health care needs can be provided without the need for an in-person physical exam.  This service lets us provide the care you need with a video conversation.  If a prescription is necessary we can send it directly to your pharmacy.  If lab work is needed we can place an order for that and you can then stop by our lab to have the test done at a later time.    If during the course of the call the physician/provider feels a video visit is not appropriate, you will not be charged for this service.\"     Physician has received verbal consent for a Video Visit from the patient? Yes    SUBJECTIVE:                                                    Shoaib Walker is a 30 year old female who presents to clinic today for the following health issues :  Reports symptoms of anxiety have improved, denies suicidal or homicidal ideation intent or plan. Reports she has used the lorazepam four of five times during episodes of acute anxiety, which has helped, she is using exercise and distraction to help with daily anxiety and has tried to reduce exposure to stress inducing stimuli.       Problem list, Medication list, Allergies, and Medical/Social/Surgical histories reviewed in EPIC and updated as appropriate.   Additional history: as documented    ROS:  5 point ROS completed and negative except noted above, including Gen, CV, Resp, GI, MS    Histories:   Patient Active Problem List   Diagnosis     FRANCISCO (generalized anxiety disorder)     Hemorrhoids, unspecified hemorrhoid type     Past Surgical History:   Procedure Laterality Date     EYE SURGERY Bilateral 2012     PE TUBES      no out       Social History     Tobacco Use     Smoking status: Never Smoker     Smokeless tobacco: Never Used   Substance Use Topics     Alcohol use: Yes     Comment: " 2-4 drinks a week      No family history on file.      Current Outpatient Medications   Medication Sig Dispense Refill     citalopram (CELEXA) 20 MG tablet Take 1 tablet (20 mg) by mouth daily 90 tablet 3     diphenhydrAMINE HCl (BENADRYL ALLERGY PO)        levonorgestrel (MIRENA) 20 MCG/24HR IUD 1 each by Intrauterine route once Put in 12/6/2016       LORazepam (ATIVAN) 1 MG tablet Take 0.5 tablets (0.5 mg) by mouth every 8 hours as needed for anxiety 30 tablet 0     Multiple Vitamin (MULTI VITAMIN DAILY PO)        trimethoprim-polymyxin b (POLYTRIM) 81698-8.1 UNIT/ML-% ophthalmic solution PLACE 1-2 DROPS INTO THE LEFT EYE EVERY 3 HOURS         OBJECTIVE:                                                    There were no vitals taken for this visit.  There is no height or weight on file to calculate BMI.   APPEARANCE: = Relaxed and in no distress  Conj/Eyelids = noninjected and lids and lashes are without inflammation  Resp effort = Calm regular breathing  SKIN = absent significant rashes or lesions on visible skin, color normal for race  Recent/Remote Memory = Alert and Oriented x 3  Mood/Affect = Cooperative and interested     ASSESSMENT/PLAN:                                                        Shoaib was seen today for med check.    Diagnoses and all orders for this visit:    Anxiety in acute stress reaction    Please continue to limit the amount of social media and new you are exposed to. Increase exercise and continue to pursue exercise you find exciting. Please use the lorazepam only as needed as it is habit forming. Please follow up with psychotherapy which is very important for your wellbeing.     Patient needs assistance with ADLs: none identified today  Patient needs assistance with iADLs: none identified today    The following health maintenance items are reviewed in Epic and correct as of today:  Health Maintenance   Topic Date Due     HIV SCREENING  05/27/2003     PREVENTIVE CARE VISIT  06/24/2020      INFLUENZA VACCINE (Season Ended) 09/01/2020     PHQ-9  12/01/2020     HPV TEST  06/24/2024     PAP  06/24/2024     DTAP/TDAP/TD IMMUNIZATION (4 - Td) 12/03/2024     IPV IMMUNIZATION  Aged Out     MENINGITIS IMMUNIZATION  Aged Out     This was a virtual video-visit conducted during the COVID-19 pandemic in regulation with social distancing and quarantine recommendations of the CDC and [place] department of health and human services.    See Patient Instructions    Alpa Coy NP  [facility]  Family Practice  [facility]  [phone]    For any issues my office # is [phone]

## 2020-09-26 DIAGNOSIS — F41.1 GAD (GENERALIZED ANXIETY DISORDER): ICD-10-CM

## 2020-10-07 RX ORDER — CITALOPRAM HYDROBROMIDE 20 MG/1
TABLET ORAL
Qty: 30 TABLET | Refills: 2 | Status: SHIPPED | OUTPATIENT
Start: 2020-10-07 | End: 2021-01-04

## 2020-12-27 ENCOUNTER — HEALTH MAINTENANCE LETTER (OUTPATIENT)
Age: 30
End: 2020-12-27

## 2021-01-10 ENCOUNTER — TELEPHONE (OUTPATIENT)
Dept: FAMILY MEDICINE | Facility: CLINIC | Age: 31
End: 2021-01-10

## 2021-01-10 DIAGNOSIS — Z20.822 SUSPECTED COVID-19 VIRUS INFECTION: Primary | ICD-10-CM

## 2021-01-10 DIAGNOSIS — R53.83 FATIGUE, UNSPECIFIED TYPE: ICD-10-CM

## 2021-01-11 DIAGNOSIS — Z20.822 SUSPECTED COVID-19 VIRUS INFECTION: ICD-10-CM

## 2021-01-11 DIAGNOSIS — R53.83 FATIGUE, UNSPECIFIED TYPE: ICD-10-CM

## 2021-01-11 PROCEDURE — U0003 INFECTIOUS AGENT DETECTION BY NUCLEIC ACID (DNA OR RNA); SEVERE ACUTE RESPIRATORY SYNDROME CORONAVIRUS 2 (SARS-COV-2) (CORONAVIRUS DISEASE [COVID-19]), AMPLIFIED PROBE TECHNIQUE, MAKING USE OF HIGH THROUGHPUT TECHNOLOGIES AS DESCRIBED BY CMS-2020-01-R: HCPCS | Performed by: NURSE PRACTITIONER

## 2021-01-11 PROCEDURE — U0005 INFEC AGEN DETEC AMPLI PROBE: HCPCS | Performed by: NURSE PRACTITIONER

## 2021-01-12 LAB
SARS-COV-2 RNA RESP QL NAA+PROBE: NOT DETECTED
SPECIMEN SOURCE: NORMAL

## 2021-01-20 ENCOUNTER — OFFICE VISIT (OUTPATIENT)
Dept: FAMILY MEDICINE | Facility: CLINIC | Age: 31
End: 2021-01-20

## 2021-01-20 VITALS
TEMPERATURE: 98.1 F | OXYGEN SATURATION: 99 % | SYSTOLIC BLOOD PRESSURE: 110 MMHG | HEART RATE: 66 BPM | HEIGHT: 62 IN | DIASTOLIC BLOOD PRESSURE: 74 MMHG | RESPIRATION RATE: 16 BRPM | BODY MASS INDEX: 22.36 KG/M2 | WEIGHT: 121.5 LBS

## 2021-01-20 DIAGNOSIS — F41.1 GAD (GENERALIZED ANXIETY DISORDER): Primary | ICD-10-CM

## 2021-01-20 DIAGNOSIS — L30.9 ECZEMA, UNSPECIFIED TYPE: ICD-10-CM

## 2021-01-20 DIAGNOSIS — R68.82 DECREASED LIBIDO: ICD-10-CM

## 2021-01-20 DIAGNOSIS — K64.9 HEMORRHOIDS, UNSPECIFIED HEMORRHOID TYPE: ICD-10-CM

## 2021-01-20 DIAGNOSIS — K58.2 IRRITABLE BOWEL SYNDROME WITH BOTH CONSTIPATION AND DIARRHEA: ICD-10-CM

## 2021-01-20 PROCEDURE — 99214 OFFICE O/P EST MOD 30 MIN: CPT | Performed by: NURSE PRACTITIONER

## 2021-01-20 RX ORDER — TRIAMCINOLONE ACETONIDE 1 MG/G
CREAM TOPICAL 2 TIMES DAILY
Qty: 45 G | Refills: 3 | Status: SHIPPED | OUTPATIENT
Start: 2021-01-20

## 2021-01-20 RX ORDER — CITALOPRAM HYDROBROMIDE 20 MG/1
20 TABLET ORAL DAILY
Qty: 90 TABLET | Refills: 1 | Status: SHIPPED | OUTPATIENT
Start: 2021-01-20 | End: 2021-09-08

## 2021-01-20 RX ORDER — TRIAMCINOLONE ACETONIDE 1 MG/G
CREAM TOPICAL 2 TIMES DAILY
COMMUNITY
End: 2021-01-20

## 2021-01-20 RX ORDER — BUPROPION HYDROCHLORIDE 150 MG/1
150 TABLET ORAL EVERY MORNING
Qty: 90 TABLET | Refills: 1 | Status: SHIPPED | OUTPATIENT
Start: 2021-01-20 | End: 2024-09-10

## 2021-01-20 ASSESSMENT — ANXIETY QUESTIONNAIRES
1. FEELING NERVOUS, ANXIOUS, OR ON EDGE: SEVERAL DAYS
6. BECOMING EASILY ANNOYED OR IRRITABLE: MORE THAN HALF THE DAYS
2. NOT BEING ABLE TO STOP OR CONTROL WORRYING: SEVERAL DAYS
GAD7 TOTAL SCORE: 6
3. WORRYING TOO MUCH ABOUT DIFFERENT THINGS: SEVERAL DAYS
IF YOU CHECKED OFF ANY PROBLEMS ON THIS QUESTIONNAIRE, HOW DIFFICULT HAVE THESE PROBLEMS MADE IT FOR YOU TO DO YOUR WORK, TAKE CARE OF THINGS AT HOME, OR GET ALONG WITH OTHER PEOPLE: SOMEWHAT DIFFICULT
5. BEING SO RESTLESS THAT IT IS HARD TO SIT STILL: NOT AT ALL
4. TROUBLE RELAXING: NOT AT ALL
7. FEELING AFRAID AS IF SOMETHING AWFUL MIGHT HAPPEN: SEVERAL DAYS

## 2021-01-20 ASSESSMENT — PATIENT HEALTH QUESTIONNAIRE - PHQ9: SUM OF ALL RESPONSES TO PHQ QUESTIONS 1-9: 7

## 2021-01-20 ASSESSMENT — MIFFLIN-ST. JEOR: SCORE: 1206.43

## 2021-01-20 NOTE — PATIENT INSTRUCTIONS
Start Bupropion 1 pill once daily. Let me know if no change in 4-8 weeks and will increase to 300 mg daily.     Low Fodmap or elimination of one food group for 2 weeks to see if changes    Thank you for coming in today!     We are working to improve our digital reputation.  Would you please help us by reviewing our clinic on Google and/or Facebook?  These are links for filling out a review for the clinic:    [email or web address]                 [email or web address]    We truly appreciate you taking the time to do this.     General Information:    Today you had your appointment with Tina Naranjo CNP  My hours are:    Monday 8 AM - 5 PM  Wednesday: 8 AM - 5 PM  Thursday: 8 AM - 5 PM  Fridays: 8 AM - 5 PM    I am not in the office Tuesdays. Therefore non urgent calls received on Tuesday will be addressed when I am back in the office on Wednesday.     If lab work was done today as part of your evaluation you will generally be contacted via My Chart, mail, or phone with the results within 1-5 days. If there is an alarming result we will contact you by phone. Lab results come back at varying times, I generally wait until all labs are resulted before making comments on results. Please note labs are automatically released to My Chart once available.     If you need refills please contact your pharmacy They will send a refill request to me to review. Please allow 3 business days for us to process all refill requests.     Please call or send a medical message with any questions or concerns     [FreeTextEntry1] : Ms. TALBOT  presenting with discomfort in the umbilical area. was told significance of findings, options, risks and benefits were explained. patient has no evidence of pathology. Ms. TALBOT will follow up  if needed. Warning signs, follow up, and restrictions were discussed with the patient. \par Patient advised to seek immediate medical attention with any acute change in symptoms or with the development of any new or worsening symptoms.  Patient's questions and concerns addressed to patient's satisfaction, and patient verbalized an understanding of the information discussed.\par \par
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
## 2021-01-21 ASSESSMENT — ANXIETY QUESTIONNAIRES: GAD7 TOTAL SCORE: 6

## 2021-09-08 DIAGNOSIS — F41.1 GAD (GENERALIZED ANXIETY DISORDER): ICD-10-CM

## 2021-09-08 RX ORDER — CITALOPRAM HYDROBROMIDE 20 MG/1
20 TABLET ORAL DAILY
Qty: 90 TABLET | Refills: 1 | Status: SHIPPED | OUTPATIENT
Start: 2021-09-08 | End: 2023-02-02 | Stop reason: ALTCHOICE

## 2021-09-08 NOTE — TELEPHONE ENCOUNTER
Citalopram. Last addressed 1/20/21.       FRANCISCO (generalized anxiety disorder)  -     citalopram (CELEXA) 20 MG tablet; Take 1 tablet (20 mg) by mouth daily  Continue Citalopram 20 mg daily. Okay to take Lorazepam as needed for severe anxiety. Recommend ongoing therapy.  Reviewed the status of her anxiety management status at length.   she is satisfied with her current treatment  Discussed the current specific treatment including the possible side effects from all current medications

## 2021-09-21 ENCOUNTER — OFFICE VISIT (OUTPATIENT)
Dept: URGENT CARE | Facility: URGENT CARE | Age: 31
End: 2021-09-21
Payer: COMMERCIAL

## 2021-09-21 VITALS
BODY MASS INDEX: 22.68 KG/M2 | TEMPERATURE: 98 F | DIASTOLIC BLOOD PRESSURE: 83 MMHG | HEART RATE: 62 BPM | WEIGHT: 122 LBS | SYSTOLIC BLOOD PRESSURE: 118 MMHG | OXYGEN SATURATION: 99 %

## 2021-09-21 DIAGNOSIS — M77.8 TENDONITIS OF WRIST, RIGHT: Primary | ICD-10-CM

## 2021-09-21 PROCEDURE — 99213 OFFICE O/P EST LOW 20 MIN: CPT | Performed by: NURSE PRACTITIONER

## 2021-09-21 RX ORDER — NAPROXEN 500 MG/1
TABLET ORAL
Qty: 20 TABLET | Refills: 0 | Status: SHIPPED | OUTPATIENT
Start: 2021-09-21

## 2021-09-22 NOTE — PROGRESS NOTES
Assessment & Plan     Tendonitis of wrist, right    - naproxen (NAPROSYN) 500 MG tablet; Take 1 tablet twice a day as needed. Take with meal.      15 minutes spent on the date of the encounter doing patient visit and documentation        See Patient Instructions    Return in about 10 days (around 10/1/2021), or if symptoms worsen or fail to improve.    Kamilah Cole, CNP  [facility] THAO Cramer is a 31 year old who presents for the following health issues     HPI     31-year-old female presents to urgent care with a work-related injury.  2 days ago was opening a door handle when her right wrist began to hurt.  Did not hear a snap or pop.  Did not notice any deformities.  Works as a  on the computer for 8 hours straight.    Review of Systems   Constitutional, HEENT, cardiovascular, pulmonary, gi and gu systems are negative, except as otherwise noted.      Objective    /83 (BP Location: Left arm, Patient Position: Sitting, Cuff Size: Adult Regular)   Pulse 62   Temp 98  F (36.7  C) (Tympanic)   Wt 55.3 kg (122 lb)   SpO2 99%   Breastfeeding No   BMI 22.68 kg/m    Body mass index is 22.68 kg/m .  Physical Exam   GENERAL: healthy, alert and no distress  MS: extremities normal- no gross deformities noted, mild pain right wrist with passive ROM. No swelling, bruising or deformities noted.   SKIN: no suspicious lesions or rashes  NEURO: Normal strength and tone, mentation intact and speech normal

## 2021-09-22 NOTE — PATIENT INSTRUCTIONS
Patient Education     Tendonitis  A tendon is the thick fibrous cord that joins muscle to bone and allows joints to move. When a tendon becomes inflamed, it is called tendonitis. This can occur from overuse, injury, or infection. This usually involves the shoulders, forearm, wrist, hands and feet. Symptoms include pain, swelling and tenderness to the touch. Moving the joint increases the pain.  It takes 4 to 6 weeks or more for tendonitis to heal. It is treated by preventing motion of the tendon, occasionally with a splint or brace, and the use of anti-inflammatory medicine.  Home care    Some people find relief with ice packs. These can be crushed or cubed ice in a plastic bag or a bag of frozen vegetables wrapped in a thin towel. Other people get better relief with heat. This can include a hot shower, hot bath, or a moist towel warmed in a microwave. Try each and use the method that feels best, for 15 to 20 minutes several times a day.    Rest the inflamed joint and protect it from movement.    You may use over-the-counter ibuprofen or naproxen to treat pain and inflammation, unless another medicine was prescribed. If you can't take these medicines, acetaminophen may help with the pain, but does not treat inflammation. If you have chronic liver or kidney disease or ever had a stomach ulcer or gastrointestinal bleeding, talk with your doctor before using these medicines.    As your symptoms improve, begin gradual motion at the involved joint.  Follow-up care  Follow up with your healthcare provider if you are not improving after 5 to 7 days of treatment.  When to seek medical advice  Call your healthcare provider right away if any of these occur:    Redness over the painful area    Increasing pain or swelling at the joint    Fever lasting 24 to 48 hours or chills, or as advised by your healthcare provider  [employer] last reviewed this educational content on 5/1/2018 2000-2021 [employer]. All  rights reserved. This information is not intended as a substitute for professional medical care. Always follow your healthcare professional's instructions.

## 2021-09-30 ENCOUNTER — TRANSFERRED RECORDS (OUTPATIENT)
Dept: FAMILY MEDICINE | Facility: CLINIC | Age: 31
End: 2021-09-30

## 2021-10-01 ENCOUNTER — MYC MEDICAL ADVICE (OUTPATIENT)
Dept: FAMILY MEDICINE | Facility: CLINIC | Age: 31
End: 2021-10-01

## 2021-10-01 DIAGNOSIS — F90.0 ATTENTION DEFICIT HYPERACTIVITY DISORDER (ADHD), PREDOMINANTLY INATTENTIVE TYPE: Primary | ICD-10-CM

## 2021-10-04 ENCOUNTER — HEALTH MAINTENANCE LETTER (OUTPATIENT)
Age: 31
End: 2021-10-04

## 2021-10-08 PROBLEM — F90.0 ATTENTION DEFICIT HYPERACTIVITY DISORDER (ADHD), PREDOMINANTLY INATTENTIVE TYPE: Status: ACTIVE | Noted: 2021-10-08

## 2021-10-08 NOTE — TELEPHONE ENCOUNTER
Message sent to patient via [employer].   Recommend in person or virtual visit to discuss ADHD.    RENARD Patricio CNP on 10/8/2021 at 8:35 AM

## 2021-10-13 ENCOUNTER — OFFICE VISIT (OUTPATIENT)
Dept: FAMILY MEDICINE | Facility: CLINIC | Age: 31
End: 2021-10-13

## 2021-10-13 VITALS
SYSTOLIC BLOOD PRESSURE: 130 MMHG | HEIGHT: 62 IN | RESPIRATION RATE: 18 BRPM | OXYGEN SATURATION: 98 % | WEIGHT: 120.6 LBS | HEART RATE: 67 BPM | DIASTOLIC BLOOD PRESSURE: 90 MMHG | BODY MASS INDEX: 22.19 KG/M2 | TEMPERATURE: 97.5 F

## 2021-10-13 DIAGNOSIS — F41.1 GAD (GENERALIZED ANXIETY DISORDER): ICD-10-CM

## 2021-10-13 DIAGNOSIS — F90.0 ATTENTION DEFICIT HYPERACTIVITY DISORDER (ADHD), PREDOMINANTLY INATTENTIVE TYPE: Primary | ICD-10-CM

## 2021-10-13 DIAGNOSIS — Z79.899 CONTROLLED SUBSTANCE AGREEMENT SIGNED: ICD-10-CM

## 2021-10-13 LAB
AMPHETAMINES (AMP) UR: NEGATIVE
BARBITURATES (BAR) UR: NEGATIVE
BENZODIAZEPINES (BZO) UR: NEGATIVE
BUPRENORPHINE (BUP) UR: NEGATIVE
CANNABINOIDS (THC) UR: NEGATIVE
COCAINE (COC) UR: NEGATIVE
MDMA UR: NEGATIVE
METHADONE (MTD) UR: NEGATIVE
METHAMPHETAMINE (METHA) UR: NEGATIVE
MORPH/OPIATES (MOP) UR: NEGATIVE
OXYCODONE (OXYCO) UR: NEGATIVE
PCP UR: NEGATIVE
SPECIMEN VALIDITY INTERNAL QC UR: NORMAL
SPECIMEN VALIDITY TEMPERATURE UR: NORMAL
SPECIMEN VALIDITY UR CREATININE: NORMAL MG/DL (ref 20–200)
SPECIMEN VALIDITY UR PH: 4 (ref 4–9)
SPECIMEN VALIDITY UR SPECIFIC GRAVITY: 1.01 (ref 1–1.02)

## 2021-10-13 PROCEDURE — 99214 OFFICE O/P EST MOD 30 MIN: CPT | Performed by: NURSE PRACTITIONER

## 2021-10-13 PROCEDURE — 80306 DRUG TEST PRSMV INSTRMNT: CPT | Performed by: NURSE PRACTITIONER

## 2021-10-13 RX ORDER — DEXTROAMPHETAMINE SACCHARATE, AMPHETAMINE ASPARTATE MONOHYDRATE, DEXTROAMPHETAMINE SULFATE AND AMPHETAMINE SULFATE 5; 5; 5; 5 MG/1; MG/1; MG/1; MG/1
20 CAPSULE, EXTENDED RELEASE ORAL DAILY
Qty: 30 CAPSULE | Refills: 0 | Status: SHIPPED | OUTPATIENT
Start: 2021-10-13

## 2021-10-13 ASSESSMENT — MIFFLIN-ST. JEOR: SCORE: 1197.35

## 2021-10-13 NOTE — LETTER
[facility]  10/13/21  Patient: Shoaib Walker  YOB: 1990  Medical Record Number: [id number]                                                                                  Non-Opioid Controlled Substance Agreement    This is an agreement between you and your provider regarding safe and appropriate use of controlled substances prescribed by your care team. Controlled substances are?medicines that can cause physical and mental dependence (abuse).     There are strict laws about having and using these medicines. We here at [facility] are  committed to working with you in your efforts to get better. To support you in this work, we'll help you schedule regular office appointments for medicine refills. If we must cancel or change your appointment for any reason, we'll make sure you have enough medicine to last until your next appointment.     As a Provider, I will:     Listen carefully to your concerns while treating you with respect.     Recommend a treatment plan that I believe is in your best interest and may involve therapies other than medicine.      Talk with you often about the possible benefits and the risk of harm of any medicine that we prescribe for you.    Assess the safety of this medicine and check how well it works.      Provide a plan on how to taper (discontinue or go off) using this medicine if the decision is made to stop its use.      ::  As a Patient, I understand controlled substances:       Are prescribed by my care provider to help me function or work and manage my condition(s).?    Are strong medicines and can cause serious side effects.       Need to be taken exactly as prescribed.?Combining controlled substances with certain medicines or chemicals (such as illegal drugs, alcohol, sedatives, sleeping pills, and benzodiazepines) can be dangerous or even fatal.? If I stop taking my medicines suddenly, I may have severe withdrawal symptoms.     The risks, benefits,  and side effects of these medicine(s) were explained to me. I agree that:    1. I will take part in other treatments as advised by my care team. This may be psychiatry or counseling, physical therapy, behavioral therapy, group treatment or a referral to specialist.    2. I will keep all my appointments and understand this is part of the monitoring of controlled substances.?My care team may require an office visit for EVERY controlled substance refill. If I miss appointments or don t follow instructions, my care team may stop my medicine    3. I will take my medicines as prescribed. I will not change the dose or schedule unless my care team tells me to. There will be no refills if I run out early.      4. I may be asked to come to the clinic and complete a urine drug test or complete a pill count. If I don t give a urine sample or participate in a pill count, the care team may stop my medicine.    5. I will only receive controlled substance prescriptions from this clinic. If I am treated by another provider, I will tell them that I am taking controlled substances and that I have a treatment agreement with this provider. I will inform my [facility] care team within one business day if I am given a prescription for any controlled substance by another healthcare provider. My [facility] care team can contact other providers and pharmacists about my use of any medicines.    6. It is up to me to make sure that I don't run out of my medicines on weekends or holidays.?If my care team is willing to refill my prescription without a visit, I must request refills only during office hours. Refills may take up to 3 business days to process. I will use one pharmacy to fill all my controlled substance prescriptions. I will notify the clinic about any changes to my insurance or medicine availability.    7. I am responsible for my prescriptions. If the medicine/prescription is lost, stolen or destroyed, it will not be  replaced.?I also agree not to share controlled substance medicines with anyone.     8. I am aware I should not use any illegal or recreational drugs. I agree not to drink alcohol unless my care team says I can.     9. If I enroll in the [place] Medical Cannabis program, I will tell my care team before my next refill.    10. I will tell my care team right away if I become pregnant, have a new medical problem treated outside of my regular clinic, or have a change in my medicines.     11. I understand that this medicine can affect my thinking, judgment and reaction time.? Alcohol and drugs affect the brain and body, which can affect the safety of my driving. Being under the influence of alcohol or drugs can affect my decision-making, behaviors, personal safety and the safety of others. Driving while impaired (DWI) can occur if a person is driving, operating or in physical control of a car, motorcycle, boat, snowmobile, ATV, motorbike, off-road vehicle or any other motor vehicle ([place] Statute 169[place].20). I understand the risk if I choose to drive or operate any vehicle or machinery.    I understand that if I do not follow any of the conditions above, my prescriptions or treatment may be stopped or changed.   I agree that my provider, clinic care team and pharmacy may work with any city, state or federal law enforcement agency that investigates the misuse, sale or other diversion of my controlled medicine. I will allow my provider to discuss my care with, or share a copy of, this agreement with any other treating provider, pharmacy or emergency room where I receive care.     I have read this agreement and have asked questions about anything I did not understand.    ________________________________________________________  Patient Signature - Shoaib Walker     ___________________                   Date     ________________________________________________________  Provider Signature - RENARD Patricio CNP        ___________________                   Date     ________________________________________________________  Witness Signature (required if provider not present while patient signing)          ___________________                   Date

## 2021-10-13 NOTE — PROGRESS NOTES
"Problem(s) Oriented visit        SUBJECTIVE:                                                    Shoaib Walker is a 31 year old female who presents to clinic today for the following health issues :    Patient had testing done for ADD/ADHD online - ADHD online. Report was reviewed prior to this appointment and appeared to be very comprehensive. Patient reports that her teachers expressed concern about her having ADHD when she was younger and now mother acknowledges she may have ADHD as well. Affects her self-confidence and is frustrating that she does not feel able to concentrate, focus or participate fully in her relationships. Affects both her work as well as home life with significant other. Does take medication for depression and anxiety as well. Has talked to therapist intermittently but not specifically for ADHD. Interested in trying medication to help with ADHD symptoms.  Drinks 2-4 alcohol-containing drinks weekly. Rare marijuana use.   Good support system including , family, friends    Problem list, Medication list, Allergies, and Medical/Social/Surgical histories reviewed in EPIC and updated as appropriate.   Additional history: as documented    ROS:  5 point ROS completed and negative except noted above, including Gen, CV, Resp, GI, MS    OBJECTIVE:                                                    BP (!) 130/90   Pulse 67   Temp 97.5  F (36.4  C) (Temporal)   Resp 18   Ht 1.562 m (5' 1.5\")   Wt 54.7 kg (120 lb 9.6 oz)   SpO2 98%   BMI 22.42 kg/m    Body mass index is 22.42 kg/m .   GENERAL: healthy, alert and no distress  PSYCH: mentation appears normal and affect normal/bright     ASSESSMENT/PLAN:                                                      BP Screening:   Last 3 BP Readings:    BP Readings from Last 3 Encounters:   10/13/21 (!) 130/90   09/21/21 118/83   01/20/21 110/74       The following was recommended to the patient:  Re-screen BP within a year and recommended lifestyle " "modifications    BMI:   Estimated body mass index is 22.42 kg/m  as calculated from the following:    Height as of this encounter: 1.562 m (5' 1.5\").    Weight as of this encounter: 54.7 kg (120 lb 9.6 oz).         Shoaib was seen today for recheck medication.    Diagnoses and all orders for this visit:    Attention deficit hyperactivity disorder (ADHD), predominantly inattentive type  -     amphetamine-dextroamphetamine (ADDERALL XR) 20 MG 24 hr capsule; Take 1 capsule (20 mg) by mouth daily  -     UScreen Toxisure ([facility])    FRANCISCO (generalized anxiety disorder)    Controlled substance agreement signed  Comments:  Signed 10/13/2021 - scanned into chart. Neg Utox    Recent diagnosis from ADHD online for ADHD, predominantly inattentive type. Discussed treatment options including psychotherapy as well as pharmacologic medications. Reviewed their possible side effects and the fact that they are controlled substances which require special handling of prescriptions and our need for close monitoring including regular follow up.    She desires to start medication and agrees to the current schedule for visits every 1-3 month(s) until stable, then every 6 months thereafter.  I reminded her that any noncompliance with regard to prescriptions or follow up may result in my declining to provide further refills of these medications.  Utox screening done, which was negative  Controlled substance agreement reviewed and signed  PDMP Review       Value Time User    State PDMP site checked  Yes 10/13/2021 12:56 PM Tina Naranjo APRN CNP              See Patient Instructions  Patient Instructions   Take Adderall 1 pill daily in the morning.   Message me if having side effects. I will call or message you in 2 weeks if I haven't heard from you.    Patient Education     Adderall XR 24 HR Extended Release Capsule 10 mg  Uses  This medicine is used for the following purposes:    attention deficit hyperactivity " disorder    narcolepsy  Instructions  Swallow the medicine without crushing or chewing it.  This medicine may be taken with or without food.  Take the medicine first thing in the morning.  Keep the medicine at room temperature. Avoid heat and direct light.  Avoid drinks with caffeine while on this medicine.  Do not take this medicine with antacids.  Please tell your doctor and pharmacist about all the medicines you take. Include both prescription and over-the-counter medicines. Also tell them about any vitamins, herbal medicines, or anything else you take for your health.  Cautions  This medicine can be habit-forming. If you use this medicine regularly for a long time, it can lead to withdrawal symptoms when you stop. Please use this medicine only as directed.  Tell your doctor and pharmacist if you ever had an allergic reaction to a medicine. Symptoms of an allergic reaction can include trouble breathing, skin rash, itching, swelling, or severe dizziness.  This medicine is associated with a rare but very serious medical condition. Please speak with your doctor about symptoms you should look out for while on this medicine. Notify your doctor immediately if you develop those symptoms.  Do not use the medication any more than instructed.  Your ability to stay alert or to react quickly may be impaired by this medicine. Do not drive or operate machinery until you know how this medicine will affect you.  Tell the doctor or pharmacist if you are pregnant, planning to be pregnant, or breastfeeding.  Do not take He's wort while on this medicine.  Ask your pharmacist if this medicine can interact with any of your other medicines. Be sure to tell them about all the medicines you take.  Please tell all your doctors and dentists that you are on this medicine before they provide care.  Do not start or stop any other medicines without first speaking to your doctor or pharmacist.  If you have painful erection or an erection  for more than 4 hours, seek medical care right away.  Do not share this medicine with anyone who has not been prescribed this medicine.  This medicine can cause serious side effects in some patients. Important information from the U[place] Food and Drug Administration (FDA) is available from your pharmacist. Please review it carefully with your pharmacist to understand the risks associated with this medicine.  Side Effects  The following is a list of some common side effects from this medicine. Please speak with your doctor about what you should do if you experience these or other side effects.    agitated feeling or trouble sleeping    decreased appetite    dizziness    dry mouth    high blood pressure    stomach upset or abdominal pain    weight loss  Call your doctor or get medical help right away if you notice any of these more serious side effects:    change in behavior    chest pain    swelling of the legs, feet, and hands    fainting    high fever    hallucinations (unusual thoughts, seeing or hearing things that are not real)    rapid heartbeat    mood changes    muscle aches, spasms or abnormal movements    feeling of numbness or tingling in your hands and feet    pale or blue skin, lips or fingernails    seizures    problems with sexual functions or desire    shakiness    shortness of breath    symptoms of stroke (such as one-sided weakness, slurred speech, confusion)    suicidal thoughts    unusual or unexplained tiredness or weakness    blurring or changes of vision  A few people may have an allergic reactions to this medicine. Symptoms can include difficulty breathing, skin rash, itching, swelling, or severe dizziness. If you notice any of these symptoms, seek medical help quickly.  Extra  Please speak with your doctor, nurse, or pharmacist if you have any questions about this medicine.  [email or web address]  IMPORTANT NOTE: This document tells you briefly how to take your  medicine, but it does not tell you all there is to know about it.Your doctor or pharmacist may give you other documents about your medicine. Please talk to them if you have any questions.Always follow their advice. There is a more complete description of this medicine available in English.Scan this code on your smartphone or tablet or use the web address below. You can also ask your pharmacist for a printout. If you have any questions, please ask your pharmacist.     2021 [facility] [employer].                   RENARD Patricio CNP  [facility]  [facility]  [phone]    For any issues my office # is [phone]

## 2021-10-13 NOTE — PATIENT INSTRUCTIONS
Take Adderall 1 pill daily in the morning.   Message me if having side effects. I will call or message you in 2 weeks if I haven't heard from you.    Patient Education     Adderall XR 24 HR Extended Release Capsule 10 mg  Uses  This medicine is used for the following purposes:    attention deficit hyperactivity disorder    narcolepsy  Instructions  Swallow the medicine without crushing or chewing it.  This medicine may be taken with or without food.  Take the medicine first thing in the morning.  Keep the medicine at room temperature. Avoid heat and direct light.  Avoid drinks with caffeine while on this medicine.  Do not take this medicine with antacids.  Please tell your doctor and pharmacist about all the medicines you take. Include both prescription and over-the-counter medicines. Also tell them about any vitamins, herbal medicines, or anything else you take for your health.  Cautions  This medicine can be habit-forming. If you use this medicine regularly for a long time, it can lead to withdrawal symptoms when you stop. Please use this medicine only as directed.  Tell your doctor and pharmacist if you ever had an allergic reaction to a medicine. Symptoms of an allergic reaction can include trouble breathing, skin rash, itching, swelling, or severe dizziness.  This medicine is associated with a rare but very serious medical condition. Please speak with your doctor about symptoms you should look out for while on this medicine. Notify your doctor immediately if you develop those symptoms.  Do not use the medication any more than instructed.  Your ability to stay alert or to react quickly may be impaired by this medicine. Do not drive or operate machinery until you know how this medicine will affect you.  Tell the doctor or pharmacist if you are pregnant, planning to be pregnant, or breastfeeding.  Do not take [place]'s wort while on this medicine.  Ask your pharmacist if this medicine can interact with any of  your other medicines. Be sure to tell them about all the medicines you take.  Please tell all your doctors and dentists that you are on this medicine before they provide care.  Do not start or stop any other medicines without first speaking to your doctor or pharmacist.  If you have painful erection or an erection for more than 4 hours, seek medical care right away.  Do not share this medicine with anyone who has not been prescribed this medicine.  This medicine can cause serious side effects in some patients. Important information from the [place] Food and Drug Administration (FDA) is available from your pharmacist. Please review it carefully with your pharmacist to understand the risks associated with this medicine.  Side Effects  The following is a list of some common side effects from this medicine. Please speak with your doctor about what you should do if you experience these or other side effects.    agitated feeling or trouble sleeping    decreased appetite    dizziness    dry mouth    high blood pressure    stomach upset or abdominal pain    weight loss  Call your doctor or get medical help right away if you notice any of these more serious side effects:    change in behavior    chest pain    swelling of the legs, feet, and hands    fainting    high fever    hallucinations (unusual thoughts, seeing or hearing things that are not real)    rapid heartbeat    mood changes    muscle aches, spasms or abnormal movements    feeling of numbness or tingling in your hands and feet    pale or blue skin, lips or fingernails    seizures    problems with sexual functions or desire    shakiness    shortness of breath    symptoms of stroke (such as one-sided weakness, slurred speech, confusion)    suicidal thoughts    unusual or unexplained tiredness or weakness    blurring or changes of vision  A few people may have an allergic reactions to this medicine. Symptoms can include difficulty breathing, skin rash, itching,  swelling, or severe dizziness. If you notice any of these symptoms, seek medical help quickly.  Extra  Please speak with your doctor, nurse, or pharmacist if you have any questions about this medicine.  [email or web address]  IMPORTANT NOTE: This document tells you briefly how to take your medicine, but it does not tell you all there is to know about it.Your doctor or pharmacist may give you other documents about your medicine. Please talk to them if you have any questions.Always follow their advice. There is a more complete description of this medicine available in English.Scan this code on your smartphone or tablet or use the web address below. You can also ask your pharmacist for a printout. If you have any questions, please ask your pharmacist.     2021 [employer].

## 2022-01-23 ENCOUNTER — HEALTH MAINTENANCE LETTER (OUTPATIENT)
Age: 32
End: 2022-01-23

## 2022-02-16 ENCOUNTER — OFFICE VISIT (OUTPATIENT)
Dept: FAMILY MEDICINE | Facility: CLINIC | Age: 32
End: 2022-02-16

## 2022-02-16 ENCOUNTER — HOSPITAL ENCOUNTER (OUTPATIENT)
Dept: CT IMAGING | Facility: CLINIC | Age: 32
Discharge: HOME | End: 2022-02-16
Attending: FAMILY MEDICINE | Admitting: FAMILY MEDICINE
Payer: COMMERCIAL

## 2022-02-16 ENCOUNTER — TELEPHONE (OUTPATIENT)
Dept: FAMILY MEDICINE | Facility: CLINIC | Age: 32
End: 2022-02-16

## 2022-02-16 VITALS
BODY MASS INDEX: 21.93 KG/M2 | OXYGEN SATURATION: 99 % | HEART RATE: 93 BPM | SYSTOLIC BLOOD PRESSURE: 102 MMHG | DIASTOLIC BLOOD PRESSURE: 76 MMHG | WEIGHT: 118 LBS | RESPIRATION RATE: 16 BRPM

## 2022-02-16 DIAGNOSIS — R10.31 RIGHT INGUINAL PAIN: Primary | ICD-10-CM

## 2022-02-16 DIAGNOSIS — R10.31 RIGHT INGUINAL PAIN: ICD-10-CM

## 2022-02-16 LAB
% GRANULOCYTES: 73.9 % (ref 42.2–75.2)
HCT VFR BLD AUTO: 37.2 % (ref 35–46)
HEMOGLOBIN: 12.4 G/DL (ref 11.8–15.5)
LYMPHOCYTES NFR BLD AUTO: 20.7 % (ref 20.5–51.1)
MCH RBC QN AUTO: 32.8 PG (ref 27–31)
MCHC RBC AUTO-ENTMCNC: 33.4 G/DL (ref 33–37)
MCV RBC AUTO: 98.2 FL (ref 80–100)
MONOCYTES NFR BLD AUTO: 5.4 % (ref 1.7–9.3)
PLATELET # BLD AUTO: 211 K/UL (ref 140–450)
RBC # BLD AUTO: 3.78 X10/CMM (ref 3.7–5.2)
WBC # BLD AUTO: 8.5 X10/CMM (ref 3.8–11)

## 2022-02-16 PROCEDURE — 74177 CT ABD & PELVIS W/CONTRAST: CPT

## 2022-02-16 PROCEDURE — 250N000011 HC RX IP 250 OP 636: Performed by: FAMILY MEDICINE

## 2022-02-16 PROCEDURE — 99214 OFFICE O/P EST MOD 30 MIN: CPT | Performed by: FAMILY MEDICINE

## 2022-02-16 PROCEDURE — 36415 COLL VENOUS BLD VENIPUNCTURE: CPT | Performed by: FAMILY MEDICINE

## 2022-02-16 PROCEDURE — 85025 COMPLETE CBC W/AUTO DIFF WBC: CPT | Performed by: FAMILY MEDICINE

## 2022-02-16 PROCEDURE — 250N000009 HC RX 250: Performed by: FAMILY MEDICINE

## 2022-02-16 RX ORDER — CITALOPRAM HYDROBROMIDE 10 MG/1
10 TABLET ORAL DAILY
COMMUNITY
End: 2023-02-02 | Stop reason: ALTCHOICE

## 2022-02-16 RX ORDER — DEXTROAMPHETAMINE SACCHARATE, AMPHETAMINE ASPARTATE, DEXTROAMPHETAMINE SULFATE AND AMPHETAMINE SULFATE 1.25; 1.25; 1.25; 1.25 MG/1; MG/1; MG/1; MG/1
5 TABLET ORAL 2 TIMES DAILY
COMMUNITY

## 2022-02-16 RX ORDER — IOPAMIDOL 755 MG/ML
58 INJECTION, SOLUTION INTRAVASCULAR ONCE
Status: COMPLETED | OUTPATIENT
Start: 2022-02-16 | End: 2022-02-16

## 2022-02-16 RX ADMIN — IOPAMIDOL 58 ML: 755 INJECTION, SOLUTION INTRAVENOUS at 17:12

## 2022-02-16 RX ADMIN — SODIUM CHLORIDE 57 ML: 9 INJECTION, SOLUTION INTRAVENOUS at 17:12

## 2022-02-16 NOTE — PROGRESS NOTES
"SUBJECTIVE:    Shoaib Walker, is a 31 year old female with FRANCISCO,  ADHD, Hx of symptoms in keeping with irritable bowel syndrome presenting for the below:     1. 1 day Hx (started this morning) of sudden onset right iliac fossa \"stabbing \" pain, 7-8 / 10 in severity associated with mild nausea and feeling slightly warm, but denies any johana fevers or chills.  Pain \"comes in waves\". Has felt mildly dizzy and lightheaded today. Constant dull ache with superimposed shape intense pain without any specific triggers. Denies any radiation. Tried leisa, eating, \"walking if off\" with no effect. Increased belching today.     No LMP recorded. (Menstrual status: IUD). Usual menstrual pattern with intermittent light spotting. No recent change in bleeding pattern.  No prior abdominal surgeries. Did have colonscopy for investigation of abdominal pain in the past : within normal limits.     Does have appendix in situ.     OBJECTIVE:  Vitals:    02/16/22 1548   BP: 102/76   Pulse: 93   Resp: 16   SpO2: 99%   Weight: 53.5 kg (118 lb)    Body mass index is 21.93 kg/m .    General: no acute distress, non toxic, cooperative with exam.  Neck: supple, no thyroid nodules or enlargement.  Lungs: clear to auscultation bilaterally, normal respiratory effort.  Heart: regular rate, normal S1 and S2, no murmurs.   Abdomen: normal bowel sounds, pronounced tenderness to palpation RIF with guarding. No rebound. On palpation of umbilical area, pain in RIF is reproduced.  no palpable organomegaly.  Neuro: grossly intact   Psych: mental status normal, mood and affect appropriate.    Lab Results   Component Value Date    WBC 8.5 02/16/2022       ASSESSMENT / PLAN:      Right inguinal pain  With onset of pain, quality and clinical exam, concerning for appendicitis. Less likely ovarian torsion. With IUD in situ unlikely ectopic. Will proceed to CT abdomen pelvis. Patient assisted with scheduling imaging with read and call later this evening. Will call " patient with results.  -     CBC with Diff/Plt ([facility])  -     CT Abdomen Pelvis w Contrast; Future  -     VENOUS COLLECTION

## 2022-02-17 NOTE — TELEPHONE ENCOUNTER
Read and call for CT abdo/pelvis received. No acute pathology. Imaging is notable for significant fecal loading. Called patient to inform. Discussed adequate hydration, addition of daily fiber supplement and my chart messaged info on home bowel clean out using dulcolax/ miralax/gatorade . Patient expressed understanding and agreement with the plan.

## 2022-09-11 ENCOUNTER — HEALTH MAINTENANCE LETTER (OUTPATIENT)
Age: 32
End: 2022-09-11

## 2023-02-02 ENCOUNTER — OFFICE VISIT (OUTPATIENT)
Dept: FAMILY MEDICINE | Facility: CLINIC | Age: 33
End: 2023-02-02

## 2023-02-02 VITALS
HEIGHT: 62 IN | WEIGHT: 122.6 LBS | OXYGEN SATURATION: 99 % | SYSTOLIC BLOOD PRESSURE: 114 MMHG | BODY MASS INDEX: 22.56 KG/M2 | DIASTOLIC BLOOD PRESSURE: 72 MMHG | HEART RATE: 87 BPM

## 2023-02-02 DIAGNOSIS — M62.89 PELVIC FLOOR DYSFUNCTION: ICD-10-CM

## 2023-02-02 DIAGNOSIS — L29.0 ANAL PRURITUS: Primary | ICD-10-CM

## 2023-02-02 DIAGNOSIS — K64.9 HEMORRHOIDS, UNSPECIFIED HEMORRHOID TYPE: ICD-10-CM

## 2023-02-02 PROCEDURE — 99213 OFFICE O/P EST LOW 20 MIN: CPT | Performed by: NURSE PRACTITIONER

## 2023-02-02 RX ORDER — ESCITALOPRAM OXALATE 20 MG/1
1 TABLET ORAL
COMMUNITY
Start: 2023-01-04 | End: 2024-09-10 | Stop reason: ALTCHOICE

## 2023-02-02 ASSESSMENT — ANXIETY QUESTIONNAIRES
6. BECOMING EASILY ANNOYED OR IRRITABLE: NEARLY EVERY DAY
GAD7 TOTAL SCORE: 6
7. FEELING AFRAID AS IF SOMETHING AWFUL MIGHT HAPPEN: NOT AT ALL
1. FEELING NERVOUS, ANXIOUS, OR ON EDGE: SEVERAL DAYS
IF YOU CHECKED OFF ANY PROBLEMS ON THIS QUESTIONNAIRE, HOW DIFFICULT HAVE THESE PROBLEMS MADE IT FOR YOU TO DO YOUR WORK, TAKE CARE OF THINGS AT HOME, OR GET ALONG WITH OTHER PEOPLE: SOMEWHAT DIFFICULT
5. BEING SO RESTLESS THAT IT IS HARD TO SIT STILL: NOT AT ALL
3. WORRYING TOO MUCH ABOUT DIFFERENT THINGS: SEVERAL DAYS
GAD7 TOTAL SCORE: 6
2. NOT BEING ABLE TO STOP OR CONTROL WORRYING: NOT AT ALL

## 2023-02-02 ASSESSMENT — PATIENT HEALTH QUESTIONNAIRE - PHQ9
5. POOR APPETITE OR OVEREATING: SEVERAL DAYS
SUM OF ALL RESPONSES TO PHQ QUESTIONS 1-9: 6

## 2023-02-02 NOTE — LETTER
[facility]  02/02/23  Patient: Shoaib Walker  YOB: 1990  Medical Record Number: [id number]                                                                                  Non-Opioid Controlled Substance Agreement    This is an agreement between you and your provider regarding safe and appropriate use of controlled substances prescribed by your care team. Controlled substances are?medicines that can cause physical and mental dependence (abuse).     There are strict laws about having and using these medicines. We here at [facility] are  committed to working with you in your efforts to get better. To support you in this work, we'll help you schedule regular office appointments for medicine refills. If we must cancel or change your appointment for any reason, we'll make sure you have enough medicine to last until your next appointment.     As a Provider, I will:     Listen carefully to your concerns while treating you with respect.     Recommend a treatment plan that I believe is in your best interest and may involve therapies other than medicine.      Talk with you often about the possible benefits and the risk of harm of any medicine that we prescribe for you.    Assess the safety of this medicine and check how well it works.      Provide a plan on how to taper (discontinue or go off) using this medicine if the decision is made to stop its use.      ::  As a Patient, I understand controlled substances:       Are prescribed by my care provider to help me function or work and manage my condition(s).?    Are strong medicines and can cause serious side effects.       Need to be taken exactly as prescribed.?Combining controlled substances with certain medicines or chemicals (such as illegal drugs, alcohol, sedatives, sleeping pills, and benzodiazepines) can be dangerous or even fatal.? If I stop taking my medicines suddenly, I may have severe withdrawal symptoms.     The risks, benefits,  and side effects of these medicine(s) were explained to me. I agree that:    1. I will take part in other treatments as advised by my care team. This may be psychiatry or counseling, physical therapy, behavioral therapy, group treatment or a referral to specialist.    2. I will keep all my appointments and understand this is part of the monitoring of controlled substances.?My care team may require an office visit for EVERY controlled substance refill. If I miss appointments or don t follow instructions, my care team may stop my medicine    3. I will take my medicines as prescribed. I will not change the dose or schedule unless my care team tells me to. There will be no refills if I run out early.      4. I may be asked to come to the clinic and complete a urine drug test or complete a pill count. If I don t give a urine sample or participate in a pill count, the care team may stop my medicine.    5. I will only receive controlled substance prescriptions from this clinic. If I am treated by another provider, I will tell them that I am taking controlled substances and that I have a treatment agreement with this provider. I will inform my [facility] care team within one business day if I am given a prescription for any controlled substance by another healthcare provider. My [facility] care team can contact other providers and pharmacists about my use of any medicines.    6. It is up to me to make sure that I don't run out of my medicines on weekends or holidays.?If my care team is willing to refill my prescription without a visit, I must request refills only during office hours. Refills may take up to 3 business days to process. I will use one pharmacy to fill all my controlled substance prescriptions. I will notify the clinic about any changes to my insurance or medicine availability.    7. I am responsible for my prescriptions. If the medicine/prescription is lost, stolen or destroyed, it will not be  replaced.?I also agree not to share controlled substance medicines with anyone.     8. I am aware I should not use any illegal or recreational drugs. I agree not to drink alcohol unless my care team says I can.     9. If I enroll in the [place] Medical Cannabis program, I will tell my care team before my next refill.    10. I will tell my care team right away if I become pregnant, have a new medical problem treated outside of my regular clinic, or have a change in my medicines.     11. I understand that this medicine can affect my thinking, judgment and reaction time.? Alcohol and drugs affect the brain and body, which can affect the safety of my driving. Being under the influence of alcohol or drugs can affect my decision-making, behaviors, personal safety and the safety of others. Driving while impaired (DWI) can occur if a person is driving, operating or in physical control of a car, motorcycle, boat, snowmobile, ATV, motorbike, off-road vehicle or any other motor vehicle ([place] Statute 169A.20). I understand the risk if I choose to drive or operate any vehicle or machinery.    I understand that if I do not follow any of the conditions above, my prescriptions or treatment may be stopped or changed.   I agree that my provider, clinic care team and pharmacy may work with any city, state or federal law enforcement agency that investigates the misuse, sale or other diversion of my controlled medicine. I will allow my provider to discuss my care with, or share a copy of, this agreement with any other treating provider, pharmacy or emergency room where I receive care.     I have read this agreement and have asked questions about anything I did not understand.    ________________________________________________________  Patient Signature - Shoaib Walker     ___________________                   Date     ________________________________________________________  Provider Signature - RENARD Patricio CNP        ___________________                   Date     ________________________________________________________  Witness Signature (required if provider not present while patient signing)          ___________________                   Date

## 2023-02-02 NOTE — PATIENT INSTRUCTIONS
Hydrocortisone 2.5% cream over the counter - apply 2 times daily then apply barrier cream (like desitin or bourdreax's butt past) over this    See how symptoms are in 2-4 weeks. If not improving, see dermatology    Pelvic floor physical therapy  Referral sent to [facility] - check insurance coverage  If not covered, let me know where you want referral sent  [phone] [address], [place] [address]

## 2023-02-02 NOTE — PROGRESS NOTES
"Problem(s) Oriented visit        SUBJECTIVE:                                                    Shoaib Walker is a 32 year old female who presents to clinic today for the following health issues :    Anal itching for past 4 months. Has bidet and tried different creams - desitin, pranicure. History of \"aggressive wiping\" for awhile. Worst at night. Does have history of psoriasis. Sees dermatology (Dr. Diaz @ [facility] dermatology). Denies use of new soaps, lotions, detergents. Does have hemorrhoids due to IBS-C. Worried about pelvic floor dysfunction.    Problem list, Medication list, Allergies, and Medical/Social/Surgical histories reviewed in [facility] and updated as appropriate.   Additional history: as documented    ROS:  3 point ROS completed and negative except noted above, including Gen, GI,     OBJECTIVE:                                                    /72   Pulse 87   Ht 1.575 m (5' 2\")   Wt 55.6 kg (122 lb 9.6 oz)   SpO2 99%   BMI 22.42 kg/m    Body mass index is 22.42 kg/m .   GENERAL: healthy, alert and no distress  PSYCH: mentation appears normal, affect normal/bright     ASSESSMENT/PLAN:                                                      BMI:   Estimated body mass index is 22.42 kg/m  as calculated from the following:    Height as of this encounter: 1.575 m (5' 2\").    Weight as of this encounter: 55.6 kg (122 lb 9.6 oz).         Shoaib was seen today for rectal problem and pelvic pain.    Diagnoses and all orders for this visit:    Anal pruritus  Recommend OTC hydrocortisone with barrier layered on top. If not improving in next 2-4 weeks, referral to derm    Hemorrhoids, unspecified hemorrhoid type  Stable    Pelvic floor dysfunction  -     Physical Therapy Referral; Future  Referral faxed to [facility] in [place]      See Patient Instructions  Patient Instructions   Hydrocortisone 2.5% cream over the counter - apply 2 times daily then apply barrier cream (like desitin or bourdreax's butt " past) over this    See how symptoms are in 2-4 weeks. If not improving, see dermatology    Pelvic floor physical therapy  Referral sent to [facility] - check insurance coverage  If not covered, let me know where you want referral sent  [phone] [address], [place] [address]      RENARD Patricio CNP  [facility]  Family Practice  [facility]  [phone]    For any issues my office # is [phone]

## 2023-02-07 ENCOUNTER — TRANSFERRED RECORDS (OUTPATIENT)
Dept: FAMILY MEDICINE | Facility: CLINIC | Age: 33
End: 2023-02-07

## 2023-03-17 ENCOUNTER — TRANSFERRED RECORDS (OUTPATIENT)
Dept: FAMILY MEDICINE | Facility: CLINIC | Age: 33
End: 2023-03-17

## 2023-04-30 ENCOUNTER — HEALTH MAINTENANCE LETTER (OUTPATIENT)
Age: 33
End: 2023-04-30

## 2023-07-06 ENCOUNTER — OFFICE VISIT (OUTPATIENT)
Dept: FAMILY MEDICINE | Facility: CLINIC | Age: 33
End: 2023-07-06

## 2023-07-06 VITALS
DIASTOLIC BLOOD PRESSURE: 94 MMHG | BODY MASS INDEX: 21.58 KG/M2 | SYSTOLIC BLOOD PRESSURE: 130 MMHG | HEART RATE: 80 BPM | OXYGEN SATURATION: 100 % | WEIGHT: 118 LBS

## 2023-07-06 DIAGNOSIS — R00.2 PALPITATIONS: Primary | ICD-10-CM

## 2023-07-06 LAB
% GRANULOCYTES: 77.2 % (ref 42.2–75.2)
HCT VFR BLD AUTO: 43.4 % (ref 35–46)
HEMOGLOBIN: 14.6 G/DL (ref 11.8–15.5)
LYMPHOCYTES NFR BLD AUTO: 17.3 % (ref 20.5–51.1)
MCH RBC QN AUTO: 33.3 PG (ref 27–31)
MCHC RBC AUTO-ENTMCNC: 33.6 G/DL (ref 33–37)
MCV RBC AUTO: 99.2 FL (ref 80–100)
MONOCYTES NFR BLD AUTO: 5.5 % (ref 1.7–9.3)
PLATELET # BLD AUTO: 237 K/UL (ref 140–450)
RBC # BLD AUTO: 4.37 X10/CMM (ref 3.7–5.2)
WBC # BLD AUTO: 8.2 X10/CMM (ref 3.8–11)

## 2023-07-06 PROCEDURE — 93000 ELECTROCARDIOGRAM COMPLETE: CPT

## 2023-07-06 PROCEDURE — 36415 COLL VENOUS BLD VENIPUNCTURE: CPT

## 2023-07-06 PROCEDURE — 85025 COMPLETE CBC W/AUTO DIFF WBC: CPT

## 2023-07-06 PROCEDURE — 99214 OFFICE O/P EST MOD 30 MIN: CPT

## 2023-07-06 RX ORDER — TRAZODONE HYDROCHLORIDE 50 MG/1
TABLET, FILM COATED ORAL
COMMUNITY
Start: 2023-06-22

## 2023-07-06 RX ORDER — DESONIDE 0.5 MG/G
CREAM TOPICAL 2 TIMES DAILY
COMMUNITY

## 2023-07-06 RX ORDER — KETOCONAZOLE 20 MG/G
CREAM TOPICAL DAILY
COMMUNITY

## 2023-07-06 RX ORDER — LORAZEPAM 0.5 MG/1
TABLET ORAL
COMMUNITY
Start: 2023-06-16

## 2023-07-06 RX ORDER — DEXTROAMPHETAMINE SACCHARATE, AMPHETAMINE ASPARTATE, DEXTROAMPHETAMINE SULFATE AND AMPHETAMINE SULFATE 2.5; 2.5; 2.5; 2.5 MG/1; MG/1; MG/1; MG/1
TABLET ORAL
COMMUNITY
Start: 2023-06-23

## 2023-07-06 RX ORDER — CLOBETASOL PROPIONATE 0.5 MG/G
CREAM TOPICAL 2 TIMES DAILY
COMMUNITY

## 2023-07-06 RX ORDER — PHENOL 1.4 %
10 AEROSOL, SPRAY (ML) MUCOUS MEMBRANE
COMMUNITY

## 2023-07-06 ASSESSMENT — PATIENT HEALTH QUESTIONNAIRE - PHQ9
5. POOR APPETITE OR OVEREATING: MORE THAN HALF THE DAYS
SUM OF ALL RESPONSES TO PHQ QUESTIONS 1-9: 10

## 2023-07-06 ASSESSMENT — ANXIETY QUESTIONNAIRES
7. FEELING AFRAID AS IF SOMETHING AWFUL MIGHT HAPPEN: NOT AT ALL
IF YOU CHECKED OFF ANY PROBLEMS ON THIS QUESTIONNAIRE, HOW DIFFICULT HAVE THESE PROBLEMS MADE IT FOR YOU TO DO YOUR WORK, TAKE CARE OF THINGS AT HOME, OR GET ALONG WITH OTHER PEOPLE: SOMEWHAT DIFFICULT
3. WORRYING TOO MUCH ABOUT DIFFERENT THINGS: SEVERAL DAYS
GAD7 TOTAL SCORE: 7
1. FEELING NERVOUS, ANXIOUS, OR ON EDGE: SEVERAL DAYS
GAD7 TOTAL SCORE: 7
5. BEING SO RESTLESS THAT IT IS HARD TO SIT STILL: SEVERAL DAYS
6. BECOMING EASILY ANNOYED OR IRRITABLE: SEVERAL DAYS
2. NOT BEING ABLE TO STOP OR CONTROL WORRYING: SEVERAL DAYS

## 2023-07-06 NOTE — PROGRESS NOTES
Assessment & Plan     Palpitations  -EKG normal  -physical exam unremarkable  -will perform Ziopatch to assess rhythm abnormalities   -could consider propranolol given patient has history of anxiety but will address that after ziopatch   -Patient verbalized understanding and is agreeable to the discussed plan of care.  -educated on red flags that would warrant emergent evaluation   - EKG 12-lead complete w/read - Clinics  - Adult Leadless EKG Monitor 3 to 7 Days  - CBC with Diff/Plt ([facility])  - Comp. Metabolic Panel (14) ([facility])  - Thyroid Panel With TSH ([facility])  - VENOUS COLLECTION      Ordering of each unique test         See Patient Instructions    No follow-ups on file.    RENARD Morejon CNP  [facility]    Bria Cramer is a 33 year old, presenting for the following health issues:  Palpitations (Heart palpitations that started after covid she had end of May. Happens most of the day.)    Palpitations    History of Present Illness       Reason for visit:  Heart palpitations  Symptom onset:  More than a month  Symptoms include:  Racing heart, pounding heart, high resting heartrate  Symptom intensity:  Moderate  Symptom progression:  Staying the same  Had these symptoms before:  No  What makes it worse:  Caffeine  What makes it better:  Ice pack on chest, singing to myself, crying    She eats 0-1 servings of fruits and vegetables daily.She consumes 1 sweetened beverage(s) daily.She exercises with enough effort to increase her heart rate 9 or less minutes per day.  She exercises with enough effort to increase her heart rate 3 or less days per week. She is missing 2 dose(s) of medications per week.  She is not taking prescribed medications regularly due to remembering to take and other.    Patient stopped adderall for a few days and 'could not function at work' did not notice improvement in palpitations      Wellbutrin x2 years  Lexapro 6-9 months     Lying flat and going to standing patient  has lightheadedness - intermittently  Feels tight in chest - highest HR has been 97 resting and normally patient is in the 60s   Concern - palpitations  Onset: more than a month  Description: pounding heart  Intensity: moderate  Progression of Symptoms:  same  Accompanying Signs & Symptoms: pounding in chest/racing heart  Previous history of similar problem: no  Precipitating factors:        Worsened by: caffeine  Alleviating factors:        Improved by: ice pack, singing to myself , crying  Therapies tried and outcome:  none   Tried stopping adderall but did not see improvement and her ability to function at work worsened        Review of Systems   Constitutional, HEENT, cardiovascular, pulmonary, gi and gu systems are negative, except as otherwise noted.      Objective    BP (!) 130/94   Pulse 80   Wt 53.5 kg (118 lb)   SpO2 100%   BMI 21.58 kg/m    Body mass index is 21.58 kg/m .  Physical Exam   GENERAL: healthy, alert and no distress  EYES: Eyes grossly normal to inspection, PERRL and conjunctivae and sclerae normal  NECK: no adenopathy, no asymmetry, masses, or scars and thyroid normal to palpation  RESP: lungs clear to auscultation - no rales, rhonchi or wheezes  CV: regular rate and rhythm, normal S1 S2, no S3 or S4, no murmur, click or rub, no peripheral edema and peripheral pulses strong  MS: no gross musculoskeletal defects noted, no edema  NEURO: Normal strength and tone, mentation intact and speech normal  PSYCH: flat affect, mentation appears normal,   LYMPH: no cervical, supraclavicular, axillary, or inguinal adenopathy    EKG - Reviewed and interpreted by me appears normal, NSR, normal axis, normal intervals, no acute ST/T changes c/w ischemia, no LVH by voltage criteria, unchanged from previous tracings

## 2023-07-08 LAB
ALBUMIN SERPL-MCNC: 4.8 G/DL (ref 3.8–4.8)
ALBUMIN/GLOB SERPL: 1.9 {RATIO} (ref 1.2–2.2)
ALP SERPL-CCNC: 63 IU/L (ref 44–121)
ALT SERPL-CCNC: 9 IU/L (ref 0–32)
AST SERPL-CCNC: 14 IU/L (ref 0–40)
BILIRUB SERPL-MCNC: 0.4 MG/DL (ref 0–1.2)
BUN SERPL-MCNC: 9 MG/DL (ref 6–20)
BUN/CREATININE RATIO: 12 (ref 9–23)
CALCIUM SERPL-MCNC: 9.5 MG/DL (ref 8.7–10.2)
CHLORIDE SERPLBLD-SCNC: 100 MMOL/L (ref 96–106)
CREAT SERPL-MCNC: 0.75 MG/DL (ref 0.57–1)
EGFR: 106 ML/MIN/1.73
FT4I SERPL CALC-MCNC: 1.9 (ref 1.2–4.9)
GLOBULIN, TOTAL: 2.5 G/DL (ref 1.5–4.5)
GLUCOSE SERPL-MCNC: 91 MG/DL (ref 70–99)
POTASSIUM SERPL-SCNC: 4.7 MMOL/L (ref 3.5–5.2)
PROT SERPL-MCNC: 7.3 G/DL (ref 6–8.5)
SODIUM SERPL-SCNC: 138 MMOL/L (ref 134–144)
T3RU NFR SERPL: 25 % (ref 24–39)
T4 TOTAL: 7.4 UG/DL (ref 4.5–12)
TOTAL CO2: 24 MMOL/L (ref 20–29)
TSH BLD-ACNC: 1.41 UIU/ML (ref 0.45–4.5)

## 2023-07-20 ENCOUNTER — HOSPITAL ENCOUNTER (OUTPATIENT)
Dept: CARDIOLOGY | Facility: CLINIC | Age: 33
Discharge: HOME | End: 2023-07-20
Payer: COMMERCIAL

## 2023-07-20 DIAGNOSIS — R00.2 PALPITATIONS: ICD-10-CM

## 2023-07-20 PROCEDURE — 93242 EXT ECG>48HR<7D RECORDING: CPT

## 2023-07-20 PROCEDURE — 93244 EXT ECG>48HR<7D REV&INTERPJ: CPT | Performed by: INTERNAL MEDICINE

## 2024-05-04 ENCOUNTER — HEALTH MAINTENANCE LETTER (OUTPATIENT)
Age: 34
End: 2024-05-04

## 2024-09-10 ENCOUNTER — OFFICE VISIT (OUTPATIENT)
Dept: FAMILY MEDICINE | Facility: CLINIC | Age: 34
End: 2024-09-10

## 2024-09-10 VITALS
HEIGHT: 62 IN | HEART RATE: 80 BPM | DIASTOLIC BLOOD PRESSURE: 74 MMHG | TEMPERATURE: 98.7 F | WEIGHT: 112.2 LBS | OXYGEN SATURATION: 100 % | SYSTOLIC BLOOD PRESSURE: 122 MMHG | BODY MASS INDEX: 20.65 KG/M2

## 2024-09-10 DIAGNOSIS — R39.9 LOWER URINARY TRACT SYMPTOMS (LUTS): Primary | ICD-10-CM

## 2024-09-10 DIAGNOSIS — R30.0 DYSURIA: ICD-10-CM

## 2024-09-10 LAB
BACTERIA (RMG): ABNORMAL
BILIRUBIN (RMG): NEGATIVE
BLOOD (RMG): NEGATIVE
CASTS (RMG): ABNORMAL
COLOR UR: YELLOW
CRYSTAL (RMG): ABNORMAL
EPITHELIAL (RMG): ABNORMAL
GLUCOSE (RMG): NEGATIVE
KETONE (RMG): NEGATIVE
LEUKOCYTE (RMG): NEGATIVE
MUCOUS (RMG): ABNORMAL
NITRITE (RMG): NEGATIVE
PH UR STRIP: 6.5 PH (ref 5–7)
PROTEIN (RMG): NEGATIVE
RBC (RMG): ABNORMAL
SP GR UR STRIP: 1.01
UROBILINOGEN (RMG): 0.2
WBC (RMG): ABNORMAL

## 2024-09-10 PROCEDURE — G2211 COMPLEX E/M VISIT ADD ON: HCPCS

## 2024-09-10 PROCEDURE — 81003 URINALYSIS AUTO W/O SCOPE: CPT

## 2024-09-10 PROCEDURE — 99213 OFFICE O/P EST LOW 20 MIN: CPT

## 2024-09-10 RX ORDER — LACTOBACILLUS RHAMNOSUS GG 10B CELL
1 CAPSULE ORAL 2 TIMES DAILY
COMMUNITY

## 2024-09-10 RX ORDER — SENNOSIDES A AND B 8.6 MG/1
1 TABLET, FILM COATED ORAL DAILY
COMMUNITY

## 2024-09-10 RX ORDER — DULOXETIN HYDROCHLORIDE 30 MG/1
60 CAPSULE, DELAYED RELEASE ORAL EVERY MORNING
COMMUNITY
Start: 2024-08-15

## 2024-09-10 NOTE — PROGRESS NOTES
"  Assessment & Plan     Dysuria  Lower urinary tract symptoms (LUTS)  Reviewed symptoms in detail. UA with no evidence of an infection of the bladder. No culture indicated.  Adequate fluid intake discussed.  Red flags that warrant emergent evaluation discussed. Follow up as needed for new or worsening symptoms or if symptoms fail to improve. Patient agreeable to plan. All questions answered.     - Urinalysis ([facility])              See Patient Instructions    Return if symptoms worsen or fail to improve, for Follow up.    Bria Cramer is a 34 year old, presenting for the following health issues:  UTI (Home test UTI positive, sx started yesterday burning, urgency , some discharge ( bleeding ?) /Hx of  UTI - uses )    HPI       Genitourinary - Female  Onset/Duration: Yesterday  Description:   Painful urination (Dysuria): YES           Frequency: YES  Blood in urine (Hematuria): unsure  Delay in urine (Hesitency): YES  Intensity: mild  Progression of Symptoms:  worsening  Accompanying Signs & Symptoms:  Fever/chills: No  Flank pain: No  Nausea and vomiting: No  Vaginal symptoms: itching-some  Abdominal/Pelvic Pain: No  History:   History of frequent UTI s: Yes, Last UTI in July. Physical therapy for pelvic floor (believe she retains urine) Hx of chronic constipation   History of kidney stones: No  Sexually Active: YES  Possibility of pregnancy: No, IUD  Precipitating or alleviating factors: None  Therapies tried and outcome: Increase fluid intake and typically  Uqora  but couldn't find the bottle until yesterday   Cefdinir has worked well for UTI symptoms in July       Review of Systems  Constitutional, HEENT, cardiovascular, pulmonary, gi and gu systems are negative, except as otherwise noted.      Objective    /74 (BP Location: Left arm, Patient Position: Sitting, Cuff Size: Adult Regular)   Pulse 80   Temp 98.7  F (37.1  C) (Oral)   Ht 1.581 m (5' 2.25\")   Wt 50.9 kg (112 lb 3.2 oz)   SpO2 100%   BMI " 20.36 kg/m    Body mass index is 20.36 kg/m .  Physical Exam   GENERAL: alert and no distress  RESP: lungs clear to auscultation - no rales, rhonchi or wheezes  CV: regular rate and rhythm, normal S1 S2, no S3 or S4, no murmur, click or rub, no peripheral edema  ABDOMEN: tenderness suprapubic  BACK: no CVA tenderness, no paralumbar tenderness  PSYCH: mentation appears normal, affect normal/bright    Results for orders placed or performed in visit on 09/10/24 (from the past 24 hour(s))   Urinalysis ([facility])   Result Value Ref Range    Color Urine Yellow     pH Urine 6.5 pH    Specific Gravity Urine 1.010     PROTEIN ([facility]) Negative Negative    GLUCOSE ([facility]) Negative Negative    KETONE ([facility]) Negative Negative    LEUKOCYTE ([facility]) Negative Negative    BLOOD ([facility]) Negative Negative    Nitrite ([facility]) Negative Negative    BILIRUBIN ([facility]) Negative Negative    UROBILINOGEN ([facility]) 0.2 0.2 - 1    WBC ([facility]) Rare     RBC ([facility]) None     CRYSTAL ([facility]) None     BACTERIA ([facility]) None     MUCOUS ([facility]) None     CASTS ([facility]) None     EPITHELIAL ([facility]) Rare            Signed Electronically by: RENARD Gallardo CNP

## 2024-10-01 ENCOUNTER — OFFICE VISIT (OUTPATIENT)
Dept: FAMILY MEDICINE | Facility: CLINIC | Age: 34
End: 2024-10-01

## 2024-10-01 VITALS
BODY MASS INDEX: 20.18 KG/M2 | SYSTOLIC BLOOD PRESSURE: 130 MMHG | DIASTOLIC BLOOD PRESSURE: 80 MMHG | WEIGHT: 111.2 LBS | TEMPERATURE: 98 F | HEART RATE: 69 BPM | OXYGEN SATURATION: 99 %

## 2024-10-01 DIAGNOSIS — R39.9 LOWER URINARY TRACT SYMPTOMS (LUTS): Primary | ICD-10-CM

## 2024-10-01 DIAGNOSIS — Z23 NEEDS FLU SHOT: ICD-10-CM

## 2024-10-01 DIAGNOSIS — Z23 NEED FOR COVID-19 VACCINE: ICD-10-CM

## 2024-10-01 LAB
BACTERIA (RMG): NORMAL
BILIRUBIN (RMG): NEGATIVE
BLOOD (RMG): NEGATIVE
CASTS (RMG): NORMAL
COLOR UR: YELLOW
CRYSTAL (RMG): NORMAL
EPITHELIAL (RMG): NORMAL
GLUCOSE (RMG): NEGATIVE
KETONE (RMG): NEGATIVE
LEUKOCYTE (RMG): NEGATIVE
MUCOUS (RMG): NORMAL
NITRITE (RMG): NEGATIVE
PH UR STRIP: 7 PH (ref 5–7)
PROTEIN (RMG): NEGATIVE
RBC (RMG): NORMAL
SP GR UR STRIP: 1.01
UROBILINOGEN (RMG): 0.2
WBC (RMG): NORMAL

## 2024-10-01 PROCEDURE — 91320 SARSCV2 VAC 30MCG TRS-SUC IM: CPT | Performed by: FAMILY MEDICINE

## 2024-10-01 PROCEDURE — 81003 URINALYSIS AUTO W/O SCOPE: CPT | Performed by: FAMILY MEDICINE

## 2024-10-01 PROCEDURE — 99213 OFFICE O/P EST LOW 20 MIN: CPT | Mod: 25 | Performed by: FAMILY MEDICINE

## 2024-10-01 PROCEDURE — 90661 CCIIV3 VAC ABX FR 0.5 ML IM: CPT | Performed by: FAMILY MEDICINE

## 2024-10-01 PROCEDURE — 90480 ADMN SARSCOV2 VAC 1/ONLY CMP: CPT | Performed by: FAMILY MEDICINE

## 2024-10-01 PROCEDURE — 90471 IMMUNIZATION ADMIN: CPT | Performed by: FAMILY MEDICINE

## 2024-10-01 RX ORDER — PHENAZOPYRIDINE HYDROCHLORIDE 200 MG/1
200 TABLET, FILM COATED ORAL 3 TIMES DAILY PRN
COMMUNITY
Start: 2024-10-01

## 2024-10-01 NOTE — PROGRESS NOTES
Problem(s) Oriented visit        SUBJECTIVE:                                                    Shoaib Walker is a 34 year old female who presents to clinic today for the following health issues :  UTI (Sx burning with urinating and not urinating, some urgency /Seen [facility] on 9/10/24 negative UTI , sx restarted on 9/26) and Health Maintenance (Pt wants both flu and covid vaccines today  )    1. Lower urinary tract symptoms (LUTS)  Currently 7 partners and can be worse after activity  - Urinalysis ([facility])  - phenazopyridine (PYRIDIUM) 200 MG tablet; Take 1 tablet (200 mg) by mouth 3 times daily as needed for irritation.    2. Needs flu shot  - VACCINE ADMINISTRATION, INITIAL    3. Need for COVID-19 vaccine  - VACCINE ADMINISTRATION, INITIAL         Problem list, Medication list, Allergies, and Medical/Social/Surgical histories reviewed in EPIC and updated as appropriate.   Additional history: as documented    ROS:  General and Resp. completed and negative except as noted above    Histories: reviewed    OBJECTIVE:                                                    /80   Pulse 69   Temp 98  F (36.7  C) (Oral)   Wt 50.4 kg (111 lb 3.2 oz)   SpO2 99%   BMI 20.18 kg/m    Body mass index is 20.18 kg/m .   APPEARANCE: = Relaxed and in no distress     ASSESSMENT/PLAN:                                                    Quality gaps reviewed    Shoaib was seen today for uti and health maintenance.    Diagnoses and all orders for this visit:    Lower urinary tract symptoms (LUTS)  -     Urinalysis ([facility])  -     phenazopyridine (PYRIDIUM) 200 MG tablet; Take 1 tablet (200 mg) by mouth 3 times daily as needed for irritation.    Needs flu shot  -     VACCINE ADMINISTRATION, INITIAL    Need for COVID-19 vaccine  -     VACCINE ADMINISTRATION, INITIAL    Other orders  -     COVID-19 12+ (PFIZER)  -     INFLUENZA TRIVALENT VACCINE (FLUCELVAX)        Will try pyridium and follow up with primary NP    Health maintenance items are  reviewed in Epic and correct as of today:    Low Malone MD  [facility] [place]  [phone]    For any issues my office # is [phone]

## 2025-04-29 ENCOUNTER — OFFICE VISIT (OUTPATIENT)
Dept: FAMILY MEDICINE | Facility: CLINIC | Age: 35
End: 2025-04-29

## 2025-04-29 VITALS
DIASTOLIC BLOOD PRESSURE: 83 MMHG | BODY MASS INDEX: 20.61 KG/M2 | HEART RATE: 85 BPM | WEIGHT: 113.6 LBS | SYSTOLIC BLOOD PRESSURE: 107 MMHG | OXYGEN SATURATION: 100 %

## 2025-04-29 DIAGNOSIS — N30.01 ACUTE CYSTITIS WITH HEMATURIA: ICD-10-CM

## 2025-04-29 DIAGNOSIS — R39.9 LOWER URINARY TRACT SYMPTOMS (LUTS): Primary | ICD-10-CM

## 2025-04-29 LAB
BACTERIA (RMG): ABNORMAL
BILIRUBIN (RMG): ABNORMAL
BLOOD (RMG): ABNORMAL
CASTS (RMG): ABNORMAL
COLOR UR: ABNORMAL
CRYSTAL (RMG): ABNORMAL
EPITHELIAL (RMG): ABNORMAL
GLUCOSE (RMG): ABNORMAL
KETONE (RMG): ABNORMAL
LEUKOCYTE (RMG): ABNORMAL
MUCOUS (RMG): ABNORMAL
NITRITE (RMG): ABNORMAL
PH UR STRIP: ABNORMAL [PH] (ref 5–7)
PROTEIN (RMG): ABNORMAL
RBC (RMG): ABNORMAL
SP GR UR STRIP: ABNORMAL
UROBILINOGEN (RMG): ABNORMAL
WBC (RMG): ABNORMAL

## 2025-04-29 PROCEDURE — 3074F SYST BP LT 130 MM HG: CPT | Performed by: FAMILY MEDICINE

## 2025-04-29 PROCEDURE — 87088 URINE BACTERIA CULTURE: CPT | Performed by: FAMILY MEDICINE

## 2025-04-29 PROCEDURE — 87086 URINE CULTURE/COLONY COUNT: CPT | Performed by: FAMILY MEDICINE

## 2025-04-29 PROCEDURE — G2211 COMPLEX E/M VISIT ADD ON: HCPCS | Performed by: FAMILY MEDICINE

## 2025-04-29 PROCEDURE — 81003 URINALYSIS AUTO W/O SCOPE: CPT | Performed by: FAMILY MEDICINE

## 2025-04-29 PROCEDURE — 99213 OFFICE O/P EST LOW 20 MIN: CPT | Performed by: FAMILY MEDICINE

## 2025-04-29 PROCEDURE — 3079F DIAST BP 80-89 MM HG: CPT | Performed by: FAMILY MEDICINE

## 2025-04-29 RX ORDER — SULFAMETHOXAZOLE AND TRIMETHOPRIM 800; 160 MG/1; MG/1
1 TABLET ORAL 2 TIMES DAILY
Qty: 6 TABLET | Refills: 0 | Status: SHIPPED | OUTPATIENT
Start: 2025-04-29 | End: 2025-05-02

## 2025-04-29 NOTE — PROGRESS NOTES
SUBJECTIVE:  Shoaib Walker is a 34 year old female presenting for evaluation. Reports urgency, frequency, lower abd pressure and mild left flank discomfort for a couple days.  No fever or chills.  No new partners.    OBJECTIVE:    Vital Signs:  /83 (BP Location: Left arm)   Pulse 85   Wt 51.5 kg (113 lb 9.6 oz)   SpO2 100%   BMI 20.61 kg/m          General:  Alert, well-appearing, in NAD.    Skin:  MMM,no dehydration.   MSK: minimal left CVA tenderness  Abd: soft, nondistended, mild lower abdominal tenderness diffusely    Labs:      Results for orders placed or performed in visit on 04/29/25 (from the past 24 hours)   Urinalysis ([facility])   Result Value Ref Range    Color Urine Orange     pH Urine      Specific Gravity Urine      PROTEIN ([facility])      GLUCOSE ([facility])      KETONE ([facility])      LEUKOCYTE ([facility])      BLOOD ([facility])      Nitrite ([facility])      BILIRUBIN ([facility])      UROBILINOGEN ([facility])      WBC ([facility]) 10-20 (A) None    RBC ([facility]) 3-5 (A) None    CRYSTAL ([facility]) None None    BACTERIA ([facility]) Few (A) None    MUCOUS ([facility]) None None    CASTS ([facility]) None None    EPITHELIAL ([facility]) Few          ASSESSMENT:      1. Lower urinary tract symptoms (LUTS)    2. Acute cystitis with hematuria         PLAN:      We discussed that her symptoms and lab results are consistent with an uncomplicated infection of the bladder.  There is very mild left CVA tenderness but she is otherwise will appearing.  Will use bactrim given mild CVA tenderness.  Antimicrobial therapy has been ordered as noted above.  The patient will be contacted if an adjustment in therapy is indicated based on urine culture.  Adequate fluid intake discussed.  Pyridium prescribed for analgesia if desired.  Red flags that would indicate more serious infection discussed and understood.  Follow up as needed.           Answers submitted by the patient for this visit:  General Questionnaire (Submitted on 4/29/2025)  Chief Complaint: Chronic problems general questions HPI  Form  What is the reason for your visit today? : uti  How many servings of fruits and vegetables do you eat daily?: 0-1  On average, how many sweetened beverages do you drink each day (Examples: soda, juice, sweet tea, etc.  Do NOT count diet or artificially sweetened beverages)?: 1  How many minutes a day do you exercise enough to make your heart beat faster?: 10 to 19  How many days a week do you exercise enough to make your heart beat faster?: 3 or less  How many days per week do you miss taking your medication?: 1  Questionnaire about: Chronic problems general questions HPI Form (Submitted on 4/29/2025)  Chief Complaint: Chronic problems general questions HPI Form

## 2025-04-30 LAB — BACTERIA UR CULT: ABNORMAL

## 2025-05-17 ENCOUNTER — HEALTH MAINTENANCE LETTER (OUTPATIENT)
Age: 35
End: 2025-05-17

## 2025-06-24 ENCOUNTER — OFFICE VISIT (OUTPATIENT)
Dept: FAMILY MEDICINE | Facility: CLINIC | Age: 35
End: 2025-06-24

## 2025-06-24 VITALS
WEIGHT: 118 LBS | HEART RATE: 67 BPM | DIASTOLIC BLOOD PRESSURE: 84 MMHG | BODY MASS INDEX: 21.71 KG/M2 | SYSTOLIC BLOOD PRESSURE: 121 MMHG | OXYGEN SATURATION: 100 % | HEIGHT: 62 IN

## 2025-06-24 DIAGNOSIS — K59.00 CONSTIPATION, UNSPECIFIED CONSTIPATION TYPE: Primary | ICD-10-CM

## 2025-06-24 DIAGNOSIS — K60.2 ANAL FISSURE: ICD-10-CM

## 2025-06-24 PROCEDURE — 3074F SYST BP LT 130 MM HG: CPT | Performed by: FAMILY MEDICINE

## 2025-06-24 PROCEDURE — 99213 OFFICE O/P EST LOW 20 MIN: CPT | Performed by: FAMILY MEDICINE

## 2025-06-24 PROCEDURE — 3079F DIAST BP 80-89 MM HG: CPT | Performed by: FAMILY MEDICINE

## 2025-06-24 PROCEDURE — G2211 COMPLEX E/M VISIT ADD ON: HCPCS | Performed by: FAMILY MEDICINE

## 2025-06-24 NOTE — PROGRESS NOTES
"SUBJECTIVE:    Shoaib Walker, is a 35 year old female presenting for the below:     1-2 day h/o fresh red blood coating stool. Associated with pain (sharp) on defecation. H/o internal hemorrhoids and constipation dating back to childhood. Recent reduction in water intake. Thinks has prompted recent worsening of constipation. Wonders about addition of pelvic floor PT.     No unintentional weight loss or appetite change.     OBJECTIVE:  Vitals:    06/24/25 1127   BP: 121/84   Pulse: 67   SpO2: 100%   Weight: 53.5 kg (118 lb)   Height: 1.575 m (5' 2\")    Body mass index is 21.58 kg/m .  General: no acute distress, cooperative with exam.  ARELY : small external anal skin tag. Stool in rectum. No mucosal masses palpated. Slight pain on ARELY     ASSESSMENT / PLAN:      Constipation, unspecified constipation type  Anal fissure  -increase fluid intake.   -discussed fiber, miralax, magnesium  -pt interested in exploring pelvic floor PT   -consider colonoscopy if symptoms persist.     The longitudinal plan of care for the diagnosis(es)/condition(s) as documented were addressed during this visit. Due to the added complexity in care, I will continue to support Shoaib in the subsequent management and with ongoing continuity of care.  "

## 2025-07-28 ENCOUNTER — TRANSFERRED RECORDS (OUTPATIENT)
Dept: FAMILY MEDICINE | Facility: CLINIC | Age: 35
End: 2025-07-28